# Patient Record
Sex: MALE | Race: WHITE | Employment: FULL TIME | URBAN - NONMETROPOLITAN AREA
[De-identification: names, ages, dates, MRNs, and addresses within clinical notes are randomized per-mention and may not be internally consistent; named-entity substitution may affect disease eponyms.]

---

## 2018-06-17 ENCOUNTER — HOSPITAL ENCOUNTER (EMERGENCY)
Age: 43
Discharge: HOME | End: 2018-06-17
Attending: EMERGENCY MEDICINE
Payer: COMMERCIAL

## 2018-06-17 ENCOUNTER — APPOINTMENT (OUTPATIENT)
Dept: GENERAL RADIOLOGY | Age: 43
End: 2018-06-17
Payer: COMMERCIAL

## 2018-06-17 VITALS
OXYGEN SATURATION: 100 % | DIASTOLIC BLOOD PRESSURE: 112 MMHG | TEMPERATURE: 98.7 F | RESPIRATION RATE: 16 BRPM | HEART RATE: 93 BPM | SYSTOLIC BLOOD PRESSURE: 185 MMHG

## 2018-06-17 DIAGNOSIS — M25.461 KNEE EFFUSION, RIGHT: Primary | ICD-10-CM

## 2018-06-17 LAB
APPEARANCE FLUID: ABNORMAL
BASO FLUID: ABNORMAL %
COLOR FLUID: YELLOW
EOSINOPHIL FLUID: ABNORMAL %
FLUID DIFF COMMENT: ABNORMAL
LYMPHOCYTES, BODY FLUID: 8 %
MONOCYTE, FLUID: ABNORMAL %
NEUTROPHIL, FLUID: 92 %
OTHER CELLS FLUID: ABNORMAL %
RBC FLUID: <3000 /MM3
SPECIMEN TYPE: ABNORMAL
WBC FLUID: ABNORMAL /MM3

## 2018-06-17 PROCEDURE — 6370000000 HC RX 637 (ALT 250 FOR IP): Performed by: EMERGENCY MEDICINE

## 2018-06-17 PROCEDURE — 89051 BODY FLUID CELL COUNT: CPT

## 2018-06-17 PROCEDURE — 86403 PARTICLE AGGLUT ANTBDY SCRN: CPT

## 2018-06-17 PROCEDURE — 87077 CULTURE AEROBIC IDENTIFY: CPT

## 2018-06-17 PROCEDURE — 87205 SMEAR GRAM STAIN: CPT

## 2018-06-17 PROCEDURE — 87186 SC STD MICRODIL/AGAR DIL: CPT

## 2018-06-17 PROCEDURE — 73562 X-RAY EXAM OF KNEE 3: CPT

## 2018-06-17 PROCEDURE — 20605 DRAIN/INJ JOINT/BURSA W/O US: CPT

## 2018-06-17 PROCEDURE — 87075 CULTR BACTERIA EXCEPT BLOOD: CPT

## 2018-06-17 PROCEDURE — 99283 EMERGENCY DEPT VISIT LOW MDM: CPT

## 2018-06-17 PROCEDURE — 89060 EXAM SYNOVIAL FLUID CRYSTALS: CPT

## 2018-06-17 PROCEDURE — 87070 CULTURE OTHR SPECIMN AEROBIC: CPT

## 2018-06-17 RX ORDER — OXYCODONE HYDROCHLORIDE AND ACETAMINOPHEN 5; 325 MG/1; MG/1
1 TABLET ORAL ONCE
Status: COMPLETED | OUTPATIENT
Start: 2018-06-17 | End: 2018-06-17

## 2018-06-17 RX ORDER — OXYCODONE HYDROCHLORIDE AND ACETAMINOPHEN 5; 325 MG/1; MG/1
1 TABLET ORAL EVERY 6 HOURS PRN
Qty: 12 TABLET | Refills: 0 | Status: SHIPPED | OUTPATIENT
Start: 2018-06-17 | End: 2018-06-20

## 2018-06-17 RX ADMIN — OXYCODONE HYDROCHLORIDE AND ACETAMINOPHEN 1 TABLET: 5; 325 TABLET ORAL at 22:06

## 2018-06-17 ASSESSMENT — PAIN DESCRIPTION - PAIN TYPE: TYPE: ACUTE PAIN

## 2018-06-17 ASSESSMENT — PAIN SCALES - GENERAL: PAINLEVEL_OUTOF10: 5

## 2018-06-17 ASSESSMENT — PAIN DESCRIPTION - LOCATION: LOCATION: KNEE

## 2018-06-17 ASSESSMENT — PAIN DESCRIPTION - ORIENTATION: ORIENTATION: RIGHT

## 2018-06-17 ASSESSMENT — PAIN DESCRIPTION - PROGRESSION: CLINICAL_PROGRESSION: GRADUALLY IMPROVING

## 2018-06-19 LAB
CRYSTALS, FLUID: POSITIVE
SPECIMEN TYPE: ABNORMAL

## 2018-06-21 LAB
CULTURE: ABNORMAL
DIRECT EXAM: ABNORMAL
DIRECT EXAM: ABNORMAL
Lab: ABNORMAL
ORGANISM: ABNORMAL
SPECIMEN DESCRIPTION: ABNORMAL
STATUS: ABNORMAL

## 2018-06-29 ENCOUNTER — HOSPITAL ENCOUNTER (OUTPATIENT)
Age: 43
Discharge: HOME | End: 2018-06-29
Payer: COMMERCIAL

## 2018-06-29 LAB
ABSOLUTE EOS #: 0.24 K/UL (ref 0–0.44)
ABSOLUTE IMMATURE GRANULOCYTE: <0.03 K/UL (ref 0–0.3)
ABSOLUTE LYMPH #: 2.42 K/UL (ref 1.1–3.7)
ABSOLUTE MONO #: 0.88 K/UL (ref 0.1–1.2)
ANION GAP SERPL CALCULATED.3IONS-SCNC: 13 MMOL/L (ref 9–17)
BASOPHILS # BLD: 1 % (ref 0–2)
BASOPHILS ABSOLUTE: 0.1 K/UL (ref 0–0.2)
BUN BLDV-MCNC: 16 MG/DL (ref 6–20)
BUN/CREAT BLD: 19 (ref 9–20)
CALCIUM SERPL-MCNC: 10.2 MG/DL (ref 8.6–10.4)
CHLORIDE BLD-SCNC: 97 MMOL/L (ref 98–107)
CO2: 28 MMOL/L (ref 20–31)
CREAT SERPL-MCNC: 0.86 MG/DL (ref 0.7–1.2)
DIFFERENTIAL TYPE: NORMAL
EKG ATRIAL RATE: 84 BPM
EKG P AXIS: 38 DEGREES
EKG P-R INTERVAL: 152 MS
EKG Q-T INTERVAL: 404 MS
EKG QRS DURATION: 90 MS
EKG QTC CALCULATION (BAZETT): 477 MS
EKG R AXIS: 28 DEGREES
EKG T AXIS: 64 DEGREES
EKG VENTRICULAR RATE: 84 BPM
EOSINOPHILS RELATIVE PERCENT: 3 % (ref 1–4)
GFR AFRICAN AMERICAN: >60 ML/MIN
GFR NON-AFRICAN AMERICAN: >60 ML/MIN
GFR SERPL CREATININE-BSD FRML MDRD: ABNORMAL ML/MIN/{1.73_M2}
GFR SERPL CREATININE-BSD FRML MDRD: ABNORMAL ML/MIN/{1.73_M2}
GLUCOSE BLD-MCNC: 98 MG/DL (ref 70–99)
HCT VFR BLD CALC: 45.2 % (ref 40.7–50.3)
HEMOGLOBIN: 15.6 G/DL (ref 13–17)
IMMATURE GRANULOCYTES: 0 %
LYMPHOCYTES # BLD: 25 % (ref 24–43)
MCH RBC QN AUTO: 31.3 PG (ref 25.2–33.5)
MCHC RBC AUTO-ENTMCNC: 34.5 G/DL (ref 28.4–34.8)
MCV RBC AUTO: 90.6 FL (ref 82.6–102.9)
MONOCYTES # BLD: 9 % (ref 3–12)
NRBC AUTOMATED: 0 PER 100 WBC
PDW BLD-RTO: 12 % (ref 11.8–14.4)
PLATELET # BLD: 271 K/UL (ref 138–453)
PLATELET ESTIMATE: NORMAL
PMV BLD AUTO: 10 FL (ref 8.1–13.5)
POTASSIUM SERPL-SCNC: 4 MMOL/L (ref 3.7–5.3)
RBC # BLD: 4.99 M/UL (ref 4.21–5.77)
RBC # BLD: NORMAL 10*6/UL
SEG NEUTROPHILS: 62 % (ref 36–65)
SEGMENTED NEUTROPHILS ABSOLUTE COUNT: 6.08 K/UL (ref 1.5–8.1)
SODIUM BLD-SCNC: 138 MMOL/L (ref 135–144)
WBC # BLD: 9.7 K/UL (ref 3.5–11.3)
WBC # BLD: NORMAL 10*3/UL

## 2018-06-29 PROCEDURE — 80048 BASIC METABOLIC PNL TOTAL CA: CPT

## 2018-06-29 PROCEDURE — 85025 COMPLETE CBC W/AUTO DIFF WBC: CPT

## 2018-06-29 PROCEDURE — 93005 ELECTROCARDIOGRAM TRACING: CPT

## 2018-06-29 PROCEDURE — 36415 COLL VENOUS BLD VENIPUNCTURE: CPT

## 2018-07-18 ENCOUNTER — HOSPITAL ENCOUNTER (OUTPATIENT)
Dept: PHYSICAL THERAPY | Age: 43
Setting detail: THERAPIES SERIES
Discharge: HOME | End: 2018-07-18
Payer: COMMERCIAL

## 2018-07-18 PROCEDURE — G0283 ELEC STIM OTHER THAN WOUND: HCPCS

## 2018-07-18 PROCEDURE — G8978 MOBILITY CURRENT STATUS: HCPCS

## 2018-07-18 PROCEDURE — G8979 MOBILITY GOAL STATUS: HCPCS

## 2018-07-18 PROCEDURE — 97161 PT EVAL LOW COMPLEX 20 MIN: CPT

## 2018-07-18 PROCEDURE — 97110 THERAPEUTIC EXERCISES: CPT

## 2018-07-18 NOTE — PROGRESS NOTES
cueing for proper gait with use of one axillary crutch. ROM of right knee is 15-52 degrees in supine. Strength right ip: grossly 4-/5; knee: 3+/5 flex within available ROM, 3-/5 ext, 8 degrees of additional lag with SLR. Pt will benefit from PT to address deficits. Prognosis: Good        Decision Making: Low Complexity    Patient Education  PT eval, HEP, POC  Pt verbalized/demonstrated good understanding:     [x] Yes         [] No, pt required further clarification. [x] Primary Impairment :  G Code:    [x] Mobility         [] Carry        [] Body Position       [] Self Care      [] Other:   Functional Impairment Current:  [] 0%    [] 1-19% [] 20-39% [] 40-59% [x] 60-79%   [] 80-99% [] 100%  Functional Impairment Goal:  [] 0%    [x] 1-19% [] 20-39% [] 40-59% [] 60-79%   [] 80-99% [] 100%  G Code Functional Impairment determined by:  [x] Clinical Judgment   [] Outcome Measure:     Goals  Short term goals  Time Frame for Short term goals: 3 weeks  Short term goal 1: Pt to be instructed in home program.   Short term goal 2: Pt to achieve 90 degrees of right knee flexion to assist with ADL's. Short term goal 3: Pt to be lacking no greater than 10 degrees of right knee ext for improved gait. Long term goals  Time Frame for Long term goals : 6 weeks  Long term goal 1: Pt to report independence and compliance with home program.   Long term goal 2: Pt to ambulate community distances without use of AD. Long term goal 3: Pt to demonstrate 4+/5 strength of right quad to assist with stairs and squatting. Long term goal 4: Pt to achieve 120 degrees of right knee flexion to assist with squatting at work. Long term goal 5: Pt to be lacking no greater than 2 degrees right knee ext for improved gait.        Patient goals : None stated        Minutes Tracking:  Time In: 1100  Time Out: 9129  Minutes: [address], PT, DPT, CMPT       7/18/2018

## 2018-07-24 ENCOUNTER — HOSPITAL ENCOUNTER (OUTPATIENT)
Dept: PHYSICAL THERAPY | Age: 43
Setting detail: THERAPIES SERIES
Discharge: HOME | End: 2018-07-24
Payer: COMMERCIAL

## 2018-07-24 PROCEDURE — 97140 MANUAL THERAPY 1/> REGIONS: CPT

## 2018-07-24 PROCEDURE — 97110 THERAPEUTIC EXERCISES: CPT

## 2018-07-24 PROCEDURE — G0283 ELEC STIM OTHER THAN WOUND: HCPCS

## 2018-07-25 ENCOUNTER — HOSPITAL ENCOUNTER (OUTPATIENT)
Dept: PHYSICAL THERAPY | Age: 43
Setting detail: THERAPIES SERIES
Discharge: HOME | End: 2018-07-25
Payer: COMMERCIAL

## 2018-07-25 PROCEDURE — 97110 THERAPEUTIC EXERCISES: CPT

## 2018-07-25 PROCEDURE — G0283 ELEC STIM OTHER THAN WOUND: HCPCS

## 2018-07-25 PROCEDURE — 97140 MANUAL THERAPY 1/> REGIONS: CPT

## 2018-07-27 ENCOUNTER — APPOINTMENT (OUTPATIENT)
Dept: PHYSICAL THERAPY | Age: 43
End: 2018-07-27
Payer: COMMERCIAL

## 2018-07-30 ENCOUNTER — HOSPITAL ENCOUNTER (OUTPATIENT)
Dept: PHYSICAL THERAPY | Age: 43
Setting detail: THERAPIES SERIES
Discharge: HOME | End: 2018-07-30
Payer: COMMERCIAL

## 2018-07-30 PROCEDURE — 97140 MANUAL THERAPY 1/> REGIONS: CPT

## 2018-07-30 PROCEDURE — 97110 THERAPEUTIC EXERCISES: CPT

## 2018-07-30 PROCEDURE — G0283 ELEC STIM OTHER THAN WOUND: HCPCS

## 2018-08-01 ENCOUNTER — HOSPITAL ENCOUNTER (OUTPATIENT)
Dept: PHYSICAL THERAPY | Age: 43
Setting detail: THERAPIES SERIES
Discharge: HOME | End: 2018-08-01
Payer: COMMERCIAL

## 2018-08-01 PROCEDURE — 97140 MANUAL THERAPY 1/> REGIONS: CPT

## 2018-08-01 PROCEDURE — G0283 ELEC STIM OTHER THAN WOUND: HCPCS

## 2018-08-01 PROCEDURE — 97110 THERAPEUTIC EXERCISES: CPT

## 2018-08-02 ENCOUNTER — APPOINTMENT (OUTPATIENT)
Dept: PHYSICAL THERAPY | Age: 43
End: 2018-08-02
Payer: COMMERCIAL

## 2018-08-06 ENCOUNTER — HOSPITAL ENCOUNTER (OUTPATIENT)
Dept: PHYSICAL THERAPY | Age: 43
Setting detail: THERAPIES SERIES
Discharge: HOME | End: 2018-08-06
Payer: COMMERCIAL

## 2018-08-06 PROCEDURE — 97140 MANUAL THERAPY 1/> REGIONS: CPT

## 2018-08-06 PROCEDURE — 97110 THERAPEUTIC EXERCISES: CPT

## 2018-08-06 PROCEDURE — G0283 ELEC STIM OTHER THAN WOUND: HCPCS

## 2018-08-06 NOTE — PROGRESS NOTES
[]Met   []Partially met  []Not met   Short term goal 2: Pt to achieve 90 degrees of right knee flexion to assist with ADL's. --met  []Met   []Partially met  []Not met   Short term goal 3: Pt to be lacking no greater than 10 degrees of right knee ext for improved gait. --met  []Met   []Partially met  []Not met      []Met   []Partially met  []Not met     Long Term Goals - Time Frame for Long term goals : 6 weeks  Long term goal 1: Pt to report independence and compliance with home program.  []Met  []Partially met  []Not met   Long term goal 2: Pt to ambulate community distances without use of AD.  []Met  []Partially met  []Not met   Long term goal 3: Pt to demonstrate 4+/5 strength of right quad to assist with stairs and squatting. []Met  []Partially met  []Not met   Long term goal 4: Pt to achieve 120 degrees of right knee flexion to assist with squatting at work. []Met  []Partially met  []Not met   Long term goal 5: Pt to be lacking no greater than 2 degrees right knee ext for improved gait.   []Met  []Partially met  []Not met       Minutes Tracking:  Time In: 9157  Time Out: Ángel  Minutes: 72    Oswaldo Diallo PTA Date: 8/6/2018

## 2018-08-07 ENCOUNTER — HOSPITAL ENCOUNTER (OUTPATIENT)
Dept: PHYSICAL THERAPY | Age: 43
Setting detail: THERAPIES SERIES
Discharge: HOME | End: 2018-08-07
Payer: COMMERCIAL

## 2018-08-07 PROCEDURE — 97110 THERAPEUTIC EXERCISES: CPT

## 2018-08-07 PROCEDURE — 97140 MANUAL THERAPY 1/> REGIONS: CPT

## 2018-08-07 PROCEDURE — G0283 ELEC STIM OTHER THAN WOUND: HCPCS

## 2018-08-07 ASSESSMENT — PAIN SCALES - GENERAL: PAINLEVEL_OUTOF10: 6

## 2018-08-07 NOTE — PROGRESS NOTES
Phone: Cristobal           Fax: [phone]                           Outpatient Physical Therapy                                                                            Daily Note    Patient: Selam Millan : 1975  CSN #: [id number]   Referring Practitioner:  Michelle Adames MD    Referral Date : 18     Date: 2018    Diagnosis: Tear of meiscus right knee, S83.206A; acute gout right knee, M10.9; infection right knee, M00.9  Treatment Diagnosis: Right knee pain    Onset Date: 18  PT Insurance Information: [employer] [place]  Total # of Visits Approved: 12 Per Physician Order         Pre-Treatment Pain:  6/10  Subjective: Pain 5-6/10. Saw  and reports he has gout in knee. Pt put back on meds for gout. Exercises:           Exercise 4: sink exercise 12-15x  Exercise 5: mini lunges  Exercise 6: supine extension stretch 8 min         Exercise 9: prone strap stretch 5 min   Exercise 10: leg press 7pl 2x15  Exercise 11: FSU/SSU 6'--15x                                    Manual:  Joint mobilization: patellar mobs   PROM: right knee                 Modalities:      Cryotherapy (Minutes\Location): 15 min with IFC       E-stim (parameters): IFC for pain and swelling                  Assessment  Assessment: Pt saw  this morning and is being put back on meds for Gout in knee. Pain remains 5-6/10. Stregthening and PROM/mobs to tolerance. IFC/cp for pain and swelling     Patient Education  PROM for home   Pt verbalized/demonstrated good understanding:     [x] Yes         [] No, pt required further clarification. Post Treatment Pain:  6/10      Plan            Goals  ( )   Short Term Goals - Time Frame for Short term goals: 3 weeks     Short term goal 1: Pt to be instructed in home program. --met                                        []Met   []Partially met  []Not met   Short term goal 2: Pt to achieve 90 degrees of right knee flexion to assist with ADL's.

## 2018-08-09 ENCOUNTER — HOSPITAL ENCOUNTER (OUTPATIENT)
Dept: PHYSICAL THERAPY | Age: 43
Setting detail: THERAPIES SERIES
Discharge: HOME | End: 2018-08-09
Payer: COMMERCIAL

## 2018-08-09 PROCEDURE — G0283 ELEC STIM OTHER THAN WOUND: HCPCS

## 2018-08-09 PROCEDURE — 97110 THERAPEUTIC EXERCISES: CPT

## 2018-08-09 PROCEDURE — 97140 MANUAL THERAPY 1/> REGIONS: CPT

## 2018-08-09 ASSESSMENT — PAIN SCALES - GENERAL: PAINLEVEL_OUTOF10: 3

## 2018-08-09 NOTE — PROGRESS NOTES
Short term goal 2: Pt to achieve 90 degrees of right knee flexion to assist with ADL's. --met  []Met   []Partially met  []Not met   Short term goal 3: Pt to be lacking no greater than 10 degrees of right knee ext for improved gait. --met  []Met   []Partially met  []Not met      []Met   []Partially met  []Not met     Long Term Goals - Time Frame for Long term goals : 6 weeks  Long term goal 1: Pt to report independence and compliance with home program.  []Met  []Partially met  []Not met   Long term goal 2: Pt to ambulate community distances without use of AD.  []Met  []Partially met  []Not met   Long term goal 3: Pt to demonstrate 4+/5 strength of right quad to assist with stairs and squatting. []Met  []Partially met  []Not met   Long term goal 4: Pt to achieve 120 degrees of right knee flexion to assist with squatting at work. []Met  []Partially met  []Not met   Long term goal 5: Pt to be lacking no greater than 2 degrees right knee ext for improved gait.   []Met  []Partially met  []Not met       Minutes Tracking:  Time In: 1300  Time Out: 1406  Minutes: [address] [facility] [address] [address] PTA Date: 8/9/2018

## 2018-08-13 ENCOUNTER — HOSPITAL ENCOUNTER (OUTPATIENT)
Dept: PHYSICAL THERAPY | Age: 43
Setting detail: THERAPIES SERIES
Discharge: HOME | End: 2018-08-13
Payer: COMMERCIAL

## 2018-08-13 PROCEDURE — G8979 MOBILITY GOAL STATUS: HCPCS

## 2018-08-13 PROCEDURE — 97110 THERAPEUTIC EXERCISES: CPT

## 2018-08-13 PROCEDURE — G0283 ELEC STIM OTHER THAN WOUND: HCPCS

## 2018-08-13 PROCEDURE — G8978 MOBILITY CURRENT STATUS: HCPCS

## 2018-08-13 NOTE — PROGRESS NOTES
Treatment Pain:  2/10      Plan  Times per week: 3  Plan weeks: 6      Goals  (Total # of Visits to Date: 7)   Short Term Goals - Time Frame for Short term goals: 3 weeks     Short term goal 1: Pt to be instructed in home program. --met                                        []Met   []Partially met  []Not met   Short term goal 2: Pt to achieve 90 degrees of right knee flexion to assist with ADL's. --met  []Met   []Partially met  []Not met   Short term goal 3: Pt to be lacking no greater than 10 degrees of right knee ext for improved gait. --met  []Met   []Partially met  []Not met      []Met   []Partially met  []Not met     Long Term Goals - Time Frame for Long term goals : 6 weeks  Long term goal 1: Pt to report independence and compliance with home program.  []Met  []Partially met  []Not met   Long term goal 2: Pt to ambulate community distances without use of AD.  []Met  []Partially met  []Not met   Long term goal 3: Pt to demonstrate 4+/5 strength of right quad to assist with stairs and squatting. []Met  []Partially met  []Not met   Long term goal 4: Pt to achieve 120 degrees of right knee flexion to assist with squatting at work. []Met  []Partially met  []Not met   Long term goal 5: Pt to be lacking no greater than 2 degrees right knee ext for improved gait.   []Met  []Partially met  []Not met       Minutes Tracking:  Time In: 1130  Time Out: [address]  Minutes: [place] [address] , PT, DPT, CMPT  Date: 8/13/2018

## 2018-08-15 ENCOUNTER — HOSPITAL ENCOUNTER (OUTPATIENT)
Dept: PHYSICAL THERAPY | Age: 43
Setting detail: THERAPIES SERIES
Discharge: HOME | End: 2018-08-15
Payer: COMMERCIAL

## 2018-08-15 PROCEDURE — G0283 ELEC STIM OTHER THAN WOUND: HCPCS

## 2018-08-15 PROCEDURE — 97110 THERAPEUTIC EXERCISES: CPT

## 2018-08-15 PROCEDURE — G8979 MOBILITY GOAL STATUS: HCPCS

## 2018-08-15 PROCEDURE — G8978 MOBILITY CURRENT STATUS: HCPCS

## 2018-08-15 NOTE — PLAN OF CARE
Good    Treatment Plan   Times per week: 3  Plan weeks: 6  [x]HP/CP      [x]Electrical Stim   [x]Therapeutic Exercise      [x]Gait Training  [x]Aquatics   []Ultrasound         [x]Patient Education/HEP   [x]Manual Therapy  []Traction    [x]Neuro-homar        [x]Soft Tissue Mobs            []Home TENS  []Iontophoresis    []Orthotic casting/fitting      []Dry Needling             Electronically signed by: Jada Barron PT, DPT, CMPT    Date: 8/15/2018      ______________________________________ Date: 8/15/2018   Physician Signature

## 2018-08-16 ENCOUNTER — HOSPITAL ENCOUNTER (OUTPATIENT)
Dept: PHYSICAL THERAPY | Age: 43
Setting detail: THERAPIES SERIES
Discharge: HOME | End: 2018-08-16
Payer: COMMERCIAL

## 2018-08-16 PROCEDURE — 97110 THERAPEUTIC EXERCISES: CPT

## 2018-08-16 PROCEDURE — G0283 ELEC STIM OTHER THAN WOUND: HCPCS

## 2018-08-20 ENCOUNTER — APPOINTMENT (OUTPATIENT)
Dept: PHYSICAL THERAPY | Age: 43
End: 2018-08-20
Payer: COMMERCIAL

## 2018-08-20 ENCOUNTER — HOSPITAL ENCOUNTER (OUTPATIENT)
Dept: PHYSICAL THERAPY | Age: 43
Setting detail: THERAPIES SERIES
Discharge: HOME | End: 2018-08-20
Payer: COMMERCIAL

## 2018-08-20 PROCEDURE — G0283 ELEC STIM OTHER THAN WOUND: HCPCS

## 2018-08-20 PROCEDURE — 97110 THERAPEUTIC EXERCISES: CPT

## 2018-08-20 NOTE — PROGRESS NOTES
Phone: Cristobal           Fax: [phone]                           Outpatient Physical Therapy                                                                            Daily Note    Patient: Dav Martinez : 1975  CSN #: [id number]   Referring Practitioner:  Zuleyka Sands MD    Referral Date : 18     Date: 2018    Diagnosis: Tear of meiscus right knee, S83.206A; acute gout right knee, M10.9; infection right knee, M00.9  Treatment Diagnosis: Right knee pain    Onset Date: 18  PT Insurance Information: [employer] [place]  Total # of Visits Approved: 18 Per Physician Order  Total # of Visits to Date: 10  No Show: 0  Canceled Appointment: 0      Pre-Treatment Pain:  4/10  Subjective: States swelling in right knee is getting a lot better. States greatest pain today is felt in left knee. Right knee pain rates 2/10, left 4/10. Will see Dr again tomorrow and plans on informing him of problems on left side. Exercises:  Exercise 2: SciFit 10 min LV 3.0  Exercise 3: step stretches - flex and ext - 3x30 sec  Exercise 6: supine extension stretch 8 min   Exercise 7: star trac 4pl 10x2 --extension; flex - 45# single leg - flex only this date  Exercise 8: [employer] - squats level  Exercise 9: prone strap stretch 5 min           Modalities:  Cryotherapy (Minutes\Location): 15 min with IFC   E-stim (parameters): IFC for pain and swelling              Assessment  Assessment: Pt has completed 12 PT visits. Last update just sent 8/15/18 to physician. ROM of right knee this date was progressed to lacking 4-5 degrees ext from neutral, 110 degrees of knee flexion. Will continue to progress as able. Pt has required slower progression of exercises due to pain levels. Patient Education  Update sent to doctor  Pt verbalized/demonstrated good understanding:     [x] Yes         [] No, pt required further clarification.     Post Treatment Pain:  2/10      Plan  Times per

## 2018-08-21 ENCOUNTER — APPOINTMENT (OUTPATIENT)
Dept: PHYSICAL THERAPY | Age: 43
End: 2018-08-21
Payer: COMMERCIAL

## 2018-08-22 ENCOUNTER — APPOINTMENT (OUTPATIENT)
Dept: PHYSICAL THERAPY | Age: 43
End: 2018-08-22
Payer: COMMERCIAL

## 2018-08-27 ENCOUNTER — HOSPITAL ENCOUNTER (OUTPATIENT)
Dept: PHYSICAL THERAPY | Age: 43
Setting detail: THERAPIES SERIES
Discharge: HOME | End: 2018-08-27
Payer: COMMERCIAL

## 2018-08-27 PROCEDURE — 97530 THERAPEUTIC ACTIVITIES: CPT

## 2018-08-27 PROCEDURE — 97110 THERAPEUTIC EXERCISES: CPT

## 2018-08-29 ENCOUNTER — HOSPITAL ENCOUNTER (OUTPATIENT)
Dept: PHYSICAL THERAPY | Age: 43
Setting detail: THERAPIES SERIES
Discharge: HOME | End: 2018-08-29
Payer: COMMERCIAL

## 2018-08-29 PROCEDURE — G0283 ELEC STIM OTHER THAN WOUND: HCPCS

## 2018-08-29 PROCEDURE — 97110 THERAPEUTIC EXERCISES: CPT

## 2018-08-30 ENCOUNTER — HOSPITAL ENCOUNTER (OUTPATIENT)
Dept: PHYSICAL THERAPY | Age: 43
Setting detail: THERAPIES SERIES
Discharge: HOME | End: 2018-08-30
Payer: COMMERCIAL

## 2018-08-30 PROCEDURE — G8980 MOBILITY D/C STATUS: HCPCS

## 2018-08-30 PROCEDURE — 97140 MANUAL THERAPY 1/> REGIONS: CPT

## 2018-08-30 PROCEDURE — G0283 ELEC STIM OTHER THAN WOUND: HCPCS

## 2018-08-30 PROCEDURE — 97110 THERAPEUTIC EXERCISES: CPT

## 2018-08-30 PROCEDURE — G8979 MOBILITY GOAL STATUS: HCPCS

## 2018-10-30 NOTE — PLAN OF CARE
Phone: Cristobal          Fax: [phone]                            Outpatient Physical Therapy                                                                    Discharge Summary    Patient: Mamadou Ennis  : 1975  CSN #: [id number]   Referring physician: No admitting provider for patient encounter. Referring Practitioner: Tee Herrera MD      Diagnosis: Tear of meiscus right knee, S83.206A; acute gout right knee, M10.9; infection right knee, M00.9      Date Treatment Initiated: 18  Date of Last Treatment: 18      PT Visit Information  Onset Date: 18  PT Insurance Information: [employer] [place]  Total # of Visits Approved: 18  Total # of Visits to Date: 15  Plan of Care/Certification Expiration Date: 18  No Show: 0  Canceled Appointment: 0  Progress Note Counter: gcode at 10th      Frequency/Duration   3 times per week   6 weeks      Treatment Received  [x]HP/CP      [x]Electrical Stim   [x]Therapeutic Exercise      [x]Gait Training  [x]Aquatics   []Ultrasound         [x]Patient Education/HEP   [x]Manual Therapy  []Traction    [x]Neuro-homar        [x]Soft Tissue Mobs            []Home TENS  []Iontophoresis    []Orthotic casting/fitting      []Dry Needling    Assessment  Assessment: Pt had an antalgic gait due to pain. Pain increased throughout session due to exercises. PROM in supine flexion 118 degrees. Girth measurement superior patella R knee was 2 cm greater than left. IFC with CP post treatment for pt discomfort. Goals  Short term goals  Time Frame for Short term goals: 3 weeks  Short term goal 1: Pt to be instructed in home program. --met  Short term goal 2: Pt to achieve 90 degrees of right knee flexion to assist with ADL's. --met  Short term goal 3: Pt to be lacking no greater than 10 degrees of right knee ext for improved gait.  --met    Long term goals  Time Frame for Long term goals : 6 weeks  Long term goal 1: Pt to report

## 2020-06-03 ENCOUNTER — HOSPITAL ENCOUNTER (OUTPATIENT)
Dept: MRI IMAGING | Age: 45
Discharge: HOME | End: 2020-06-05
Payer: COMMERCIAL

## 2020-06-03 PROCEDURE — 73721 MRI JNT OF LWR EXTRE W/O DYE: CPT

## 2020-10-07 ENCOUNTER — HOSPITAL ENCOUNTER (OUTPATIENT)
Dept: PREADMISSION TESTING | Age: 45
Discharge: HOME | End: 2020-10-11
Payer: COMMERCIAL

## 2020-10-07 VITALS
WEIGHT: 255.9 LBS | SYSTOLIC BLOOD PRESSURE: 129 MMHG | TEMPERATURE: 96.9 F | BODY MASS INDEX: 33.91 KG/M2 | OXYGEN SATURATION: 97 % | HEART RATE: 77 BPM | HEIGHT: 73 IN | DIASTOLIC BLOOD PRESSURE: 83 MMHG | RESPIRATION RATE: 16 BRPM

## 2020-10-07 LAB
ANION GAP SERPL CALCULATED.3IONS-SCNC: 12 MMOL/L (ref 9–17)
BUN BLDV-MCNC: 19 MG/DL (ref 6–20)
BUN/CREAT BLD: 22 (ref 9–20)
CALCIUM SERPL-MCNC: 9.8 MG/DL (ref 8.6–10.4)
CHLORIDE BLD-SCNC: 100 MMOL/L (ref 98–107)
CO2: 25 MMOL/L (ref 20–31)
CREAT SERPL-MCNC: 0.87 MG/DL (ref 0.7–1.2)
EKG ATRIAL RATE: 69 BPM
EKG P AXIS: 34 DEGREES
EKG P-R INTERVAL: 160 MS
EKG Q-T INTERVAL: 422 MS
EKG QRS DURATION: 90 MS
EKG QTC CALCULATION (BAZETT): 452 MS
EKG R AXIS: 26 DEGREES
EKG T AXIS: 56 DEGREES
EKG VENTRICULAR RATE: 69 BPM
GFR AFRICAN AMERICAN: >60 ML/MIN
GFR NON-AFRICAN AMERICAN: >60 ML/MIN
GFR SERPL CREATININE-BSD FRML MDRD: ABNORMAL ML/MIN/{1.73_M2}
GFR SERPL CREATININE-BSD FRML MDRD: ABNORMAL ML/MIN/{1.73_M2}
GLUCOSE BLD-MCNC: 112 MG/DL (ref 70–99)
HCT VFR BLD CALC: 45.2 % (ref 40.7–50.3)
HEMOGLOBIN: 14.8 G/DL (ref 13–17)
MCH RBC QN AUTO: 30.1 PG (ref 25.2–33.5)
MCHC RBC AUTO-ENTMCNC: 32.7 G/DL (ref 28.4–34.8)
MCV RBC AUTO: 92.1 FL (ref 82.6–102.9)
NRBC AUTOMATED: 0 PER 100 WBC
PDW BLD-RTO: 12.2 % (ref 11.8–14.4)
PLATELET # BLD: 228 K/UL (ref 138–453)
PMV BLD AUTO: 10.6 FL (ref 8.1–13.5)
POTASSIUM SERPL-SCNC: 3.9 MMOL/L (ref 3.7–5.3)
RBC # BLD: 4.91 M/UL (ref 4.21–5.77)
SODIUM BLD-SCNC: 137 MMOL/L (ref 135–144)
WBC # BLD: 7.4 K/UL (ref 3.5–11.3)

## 2020-10-07 PROCEDURE — 85027 COMPLETE CBC AUTOMATED: CPT

## 2020-10-07 PROCEDURE — 36415 COLL VENOUS BLD VENIPUNCTURE: CPT

## 2020-10-07 PROCEDURE — 80048 BASIC METABOLIC PNL TOTAL CA: CPT

## 2020-10-07 ASSESSMENT — PAIN DESCRIPTION - PAIN TYPE: TYPE: CHRONIC PAIN

## 2020-10-07 ASSESSMENT — PAIN DESCRIPTION - ORIENTATION: ORIENTATION: LEFT

## 2020-10-07 ASSESSMENT — PAIN DESCRIPTION - LOCATION: LOCATION: KNEE

## 2020-10-07 ASSESSMENT — PAIN SCALES - GENERAL: PAINLEVEL_OUTOF10: 4

## 2020-10-07 ASSESSMENT — PAIN DESCRIPTION - DESCRIPTORS: DESCRIPTORS: ACHING

## 2020-10-07 NOTE — PROGRESS NOTES
[facility]   Preadmission Testing    Name: Oscar Dotson  : 1975  Patient Phone: [phone] (home)     Procedure Knee Scope   Date of Procedure: 2020  Surgeon: No att. providers found    Ht:  6' 1\" (185.4 cm)  Wt: 255 lb 14.4 oz (116.1 kg)  Wt method: Actual    Allergies: Allergies   Allergen Reactions    Vicodin [Hydrocodone-Acetaminophen] Nausea And Vomiting       Peanut allergy: No    Latex Allergy Screening Tool  Have you ever had a reaction to or been told by a physician that you have an allergy to latex or natural rubber?: No    Vitals:    10/07/20 0903   BP: 129/83   Pulse: 77   Resp: 16   Temp: 96.9 °F (36.1 °C)   SpO2: 97%       No LMP for male patient. Do you take blood thinners? [x] Yes    [] No         Instructed to stop blood thinners prior to procedure? [x] Yes    [] No      [] N/A   Do you have sleep apnea? [] Yes    [x] No     Instructed to bring CPAP machine? [] Yes    [] No    [x] N/A   Do you have acid reflux ? [x] Yes    [] No     Do you have  hiatal hernia? [x] Yes    [] No    Do you ever experience motion sickness? [] Yes    [x] No     Have you had a respiratory infection or sore throat in last 4 weeks before surgery? [] Yes    [x] No     Do you have poorly controlled asthma or COPD? [] Yes    [x] No     Do you have a history of angina in the last month or symptomatic arrhythmia? [] Yes    [x] No     Do you have significant central nervous system disease? [] Yes    [x] No     Have you had an EKG, labs, or chest xray in last 12 months? If yes provide copies to anesthesia   [] Yes    [x] No       [] Lab    [] EKG    [] CXR     Have you had a stress test?     [x] Yes    [] No    When/where:[place] > 10 years     Was it normal?    [x] Yes    [] No   Do you or your family have a history of Malignant Hyperthermia?    [] Yes    [x] No               PAT Call/Visit Questions  Person Interviewed: patient  Relationship to Patient: self  Surgery Location Verified: Yes  Patient Language: English  Medical History Reviewed: Yes  NPO Status Reinforced: Yes  Ride and Caregiver Arranged: Yes  Ride Caregiver Provider: Wife- Shadow  Patient Knows to Bring Current Medications: Yes    Pre-AdmissionTesting Checklist  Patient has been to this health system before?: Yes  Does patient refuse blood?: No  Healthcare Directive: No, patient does not have an advance directive for healthcare treatment   needed: No  Patient can read and write?: Yes  Meds-to-Beds: Does the patient want to have any new prescriptions delivered to bedside prior to discharge?: Not Assessed  History given by: Patient  Providing self care at home?: Yes  Discharge transport (for same day patients): Family    Patient instructed on the pre-operative, intra-operative, and post-operative process? Yes  Medication instructions reviewed with patient? Yes  Pre operative instruction sheet reviewed and given to patient in PAT?   Yes

## 2020-10-07 NOTE — PROGRESS NOTES
Reza Liang notified of pts EKG and that pt stated at PAT visit that he has had recent chest pain that he thinks is due to anxiety. Pt needs cardiac clearance prior to surgery per Jaime Bryan. Dionisio Mendieta notified at Dr. Rella Klinefelter office that pt needs cardiac clearance.

## 2020-10-26 ENCOUNTER — OFFICE VISIT (OUTPATIENT)
Dept: CARDIOLOGY | Age: 45
End: 2020-10-26
Payer: COMMERCIAL

## 2020-10-26 ENCOUNTER — HOSPITAL ENCOUNTER (OUTPATIENT)
Dept: PREADMISSION TESTING | Age: 45
Setting detail: SPECIMEN
Discharge: HOME | End: 2020-10-30
Payer: COMMERCIAL

## 2020-10-26 VITALS
RESPIRATION RATE: 18 BRPM | WEIGHT: 259.4 LBS | HEART RATE: 77 BPM | OXYGEN SATURATION: 98 % | BODY MASS INDEX: 34.38 KG/M2 | DIASTOLIC BLOOD PRESSURE: 81 MMHG | SYSTOLIC BLOOD PRESSURE: 122 MMHG | HEIGHT: 73 IN

## 2020-10-26 LAB
SARS-COV-2, RAPID: NORMAL
SARS-COV-2: NORMAL
SARS-COV-2: NOT DETECTED
SOURCE: NORMAL

## 2020-10-26 PROCEDURE — U0003 INFECTIOUS AGENT DETECTION BY NUCLEIC ACID (DNA OR RNA); SEVERE ACUTE RESPIRATORY SYNDROME CORONAVIRUS 2 (SARS-COV-2) (CORONAVIRUS DISEASE [COVID-19]), AMPLIFIED PROBE TECHNIQUE, MAKING USE OF HIGH THROUGHPUT TECHNOLOGIES AS DESCRIBED BY CMS-2020-01-R: HCPCS

## 2020-10-26 PROCEDURE — C9803 HOPD COVID-19 SPEC COLLECT: HCPCS

## 2020-10-26 PROCEDURE — 99244 OFF/OP CNSLTJ NEW/EST MOD 40: CPT | Performed by: INTERNAL MEDICINE

## 2020-10-26 PROCEDURE — 93000 ELECTROCARDIOGRAM COMPLETE: CPT | Performed by: INTERNAL MEDICINE

## 2020-10-26 RX ORDER — PANTOPRAZOLE SODIUM 40 MG/1
TABLET, DELAYED RELEASE ORAL
COMMUNITY
Start: 2020-10-20

## 2020-10-26 RX ORDER — ALLOPURINOL 100 MG/1
300 TABLET ORAL
COMMUNITY

## 2020-10-26 SDOH — SOCIAL STABILITY: SOCIAL NETWORK: ARE YOU MARRIED, WIDOWED, DIVORCED, SEPARATED, NEVER MARRIED, OR LIVING WITH A PARTNER?: PATIENT DECLINED

## 2020-10-26 SDOH — HEALTH STABILITY: PHYSICAL HEALTH: ON AVERAGE, HOW MANY MINUTES DO YOU ENGAGE IN EXERCISE AT THIS LEVEL?: PATIENT DECLINED

## 2020-10-26 SDOH — SOCIAL STABILITY: SOCIAL INSECURITY: WITHIN THE LAST YEAR, HAVE YOU BEEN AFRAID OF YOUR PARTNER OR EX-PARTNER?: PATIENT DECLINED

## 2020-10-26 SDOH — SOCIAL STABILITY: SOCIAL NETWORK
DO YOU BELONG TO ANY CLUBS OR ORGANIZATIONS SUCH AS CHURCH GROUPS UNIONS, FRATERNAL OR ATHLETIC GROUPS, OR SCHOOL GROUPS?: PATIENT DECLINED

## 2020-10-26 SDOH — SOCIAL STABILITY: SOCIAL INSECURITY
WITHIN THE LAST YEAR, HAVE TO BEEN RAPED OR FORCED TO HAVE ANY KIND OF SEXUAL ACTIVITY BY YOUR PARTNER OR EX-PARTNER?: PATIENT DECLINED

## 2020-10-26 SDOH — HEALTH STABILITY: PHYSICAL HEALTH
ON AVERAGE, HOW MANY DAYS PER WEEK DO YOU ENGAGE IN MODERATE TO STRENUOUS EXERCISE (LIKE A BRISK WALK)?: PATIENT DECLINED

## 2020-10-26 SDOH — SOCIAL STABILITY: SOCIAL NETWORK: IN A TYPICAL WEEK, HOW MANY TIMES DO YOU TALK ON THE PHONE WITH FAMILY, FRIENDS, OR NEIGHBORS?: PATIENT DECLINED

## 2020-10-26 SDOH — SOCIAL STABILITY: SOCIAL INSECURITY
WITHIN THE LAST YEAR, HAVE YOU BEEN KICKED, HIT, SLAPPED, OR OTHERWISE PHYSICALLY HURT BY YOUR PARTNER OR EX-PARTNER?: PATIENT DECLINED

## 2020-10-26 SDOH — HEALTH STABILITY: MENTAL HEALTH
STRESS IS WHEN SOMEONE FEELS TENSE, NERVOUS, ANXIOUS, OR CAN'T SLEEP AT NIGHT BECAUSE THEIR MIND IS TROUBLED. HOW STRESSED ARE YOU?: PATIENT DECLINED

## 2020-10-26 SDOH — SOCIAL STABILITY: SOCIAL INSECURITY
WITHIN THE LAST YEAR, HAVE YOU BEEN HUMILIATED OR EMOTIONALLY ABUSED IN OTHER WAYS BY YOUR PARTNER OR EX-PARTNER?: PATIENT DECLINED

## 2020-10-26 SDOH — SOCIAL STABILITY: SOCIAL NETWORK: HOW OFTEN DO YOU ATTENT MEETINGS OF THE CLUB OR ORGANIZATION YOU BELONG TO?: PATIENT DECLINED

## 2020-10-26 SDOH — SOCIAL STABILITY: SOCIAL NETWORK: HOW OFTEN DO YOU GET TOGETHER WITH FRIENDS OR RELATIVES?: PATIENT DECLINED

## 2020-10-26 SDOH — SOCIAL STABILITY: SOCIAL NETWORK: HOW OFTEN DO YOU ATTEND CHURCH OR RELIGIOUS SERVICES?: PATIENT DECLINED

## 2020-10-26 NOTE — PROGRESS NOTES
Samira Elias am scribing for and in the presence of Jose Cruz Centeno MD.    Patient: Dev Onofre  : 1975  Date of Visit: 2020    REASON FOR VISIT / CONSULTATION: Establish Cardiologist (HX:HTN Pt is here to est care today he states he is have Left knee scope on  with Dr Gasper Velazquez He states he does have CP sometimes due to acid reflux,SOB with exertion, heart races with exertion. He has put on 40lbs since April. Denies:lightheaded/dizziness,)      History of Present Illness:        Dear Tamika Jones MD and Dr Gasper Velazquez,     I had the pleasure of seeing your patient Dev Onofre as part of a pre-operative cardiac evaluation today. He reported history of coronary artery disease diagnosed by cardiac catheterization about 10 years ago as [facility]. He was told that he has some blockages and was treated with medication. He stopped taking his cardiac medications about a year after his cardiac cath. He did not follow-up with cardiology since. He has been smoking for 20 years and one pack daily. His father had heart disease starting at 61 and  at 59years old. He has controlled hypertension denies having diabetes or high cholesterol. He had heart cath done 10-12 years ago at [facility]. Exercise Tolerance: Mr. Nikita Moura reports that he has a very limited exercise tolerance. His says that he can really not ambulate at all due to left knee pain. Mr. Nikita Moura is here today to establish care for preop. He is having left knee procedure on 2020 with Dr Gasper Velazquez. He was very active until April when he hurt knee. He is complaining of chest discomfort intermittently. He attributes this to his heartburn. He is taking Zantac with minimal relief. No chest pressure or tightness. No precipitating or relieving factors for his epigastric/chest pain. He has shortness of breath on exertion but he attributes this to his weight gain.   He does not sleep well at night but reported no clear history of orthopnea or PND. No fever or cough. No chills. No nausea, vomiting or abdominal pain. No problems with current medications. EKG done today in office 10/26/2020 shows nonspecific T wave abnormalities. 74 bpm.     PAST MEDICAL HISTORY:        Past Medical History:   Diagnosis Date    Angina effort     Gout     Hypertension     no longer on medication       CURRENT ALLERGIES: Vicodin [hydrocodone-acetaminophen] REVIEW OF SYSTEMS: 14 systems were reviewed. Pertinent positives and negatives as above, all else negative. Past Surgical History:   Procedure Laterality Date    CHOLECYSTECTOMY, LAPAROSCOPIC  07/2012    KNEE SURGERY  1995    KNEE SURGERY Right 2018    PILONIDAL CYST EXCISION  2008    Social History:  Social History     Tobacco Use    Smoking status: Current Every Day Smoker     Packs/day: 1.00     Years: 20.00     Pack years: 20.00     Types: Cigarettes    Smokeless tobacco: Never Used   Substance Use Topics    Alcohol use: Yes     Comment: rare    Drug use: No        CURRENT MEDICATIONS:        Outpatient Medications Marked as Taking for the 10/26/20 encounter (Office Visit) with Stacie Alcaraz MD   Medication Sig Dispense Refill    allopurinol (ZYLOPRIM) 100 MG tablet allopurinol 100 mg tablet   TAKE 1 TABLET BY MOUTH EVERY DAY      pantoprazole (PROTONIX) 40 MG tablet TAKE 1 TABLET BY MOUTH EVERY DAY      ibuprofen (ADVIL;MOTRIN) 400 MG tablet Take 1 tablet by mouth every 6 hours as needed for Pain or Fever. 20 tablet 0       FAMILY HISTORY: family history includes Cancer in his father and mother; Heart Disease (age of onset: 61) in his father; Rheum Arthritis in his mother. Physical Examination:     /81 (Site: Left Upper Arm, Position: Sitting, Cuff Size: Large Adult)   Pulse 77   Resp 18   Ht 6' 1\" (1.854 m)   Wt 259 lb 6.4 oz (117.7 kg)   SpO2 98%   BMI 34.22 kg/m²  Body mass index is 34.22 kg/m².     Constitutional: He is oriented to person, place, and time. He appears well-developed and well-nourished. In no acute distress. HEENT: Normocephalic and atraumatic. No JVD present. Carotid bruit is not present. No mass and no thyromegaly present. No lymphadenopathy present. Cardiovascular: Normal rate, regular rhythm, normal heart sounds. Exam reveals no gallop and no friction rubs. No murmur was heard. .   Pulmonary/Chest: Effort normal and breath sounds normal. No respiratory distress. He has no wheezes, rhonchi or rales. Abdominal: Soft, non-tender. Bowel sounds and aorta are normal. He exhibits no organomegaly, mass or bruit. Extremities: No significant edema. No cyanosis or clubbing. 2+ radial and carotid pulses. Distal extremity pulses: 2+ bilaterally. Neurological: He is alert and oriented to person, place, and time. No evidence of gross cranial nerve deficit. Coordination appeared normal.   Skin: Skin is warm and dry. There is no rash or diaphoresis. Psychiatric: He has a normal mood and affect. His speech is normal and behavior is normal.      MOST RECENT LABS ON RECORD:   Lab Results   Component Value Date    WBC 7.4 10/07/2020    HGB 14.8 10/07/2020    HCT 45.2 10/07/2020     10/07/2020    ALT 18 12/18/2014    AST 17 12/18/2014     10/07/2020    K 3.9 10/07/2020     10/07/2020    CREATININE 0.87 10/07/2020    BUN 19 10/07/2020    CO2 25 10/07/2020       ASSESSMENT:     1. Atypical chest pain    2. Preop cardiovascular exam    3. Essential hypertension    4. ASHD (arteriosclerotic heart disease)    5. Abnormal ECG    6. Tobacco abuse    7. Tobacco abuse counseling    8. Obesity (BMI 30.0-34. 9)         PLAN:        · Pre-Op Clearance:   · Atypical chest pain and history of atherosclerotic heart disease diagnosed by cardiac catheterization about 10 years ago. · Patient complaining of epigastric/lower chest pain which he attributes to his heartburn. No significant relief with Zantac.   · History of coronary artery disease diagnosed by cardiac catheterization more than 10 years ago. Cardiac cath was done as an emergency through the [address] Patient took medicine only for 1 year and did not follow-up with cardiology after that. · His ECG today showed sinus rhythm with nonspecific T wave abnormalities. No acute ischemic changes. · Has exertional shortness of breath that he attributes it to limited mobility and weight gain. · Pre-Operative Risk assessment using 2014 ACC/AHA guidelines   · Emergent procedure No  · Active Cardiac Condition No (decompensated HF, Arrhythmia, MI <3 weeks, severe valve disease)  · Risk Level of Procedure Intermediate Risk (intraperitoneal, intrathoracic, HENT, orthopedic, or carotid endarterectomy, etc.)  · Revised Cardiac Risk Index Risk factors: None  · Measurement of Exercise Tolerance before Surgery >4 No  · According to the 2014 ACC/AHA pre-operative risk assessment guidelines Osman Marinelli is at intermediate risk for major cardiac complications during a intermediate risk procedure and may continue as planned. Specific medication recommendations are listed below. Medications recommended to continue should be taken with a sip of water even when NPO.  Medical management to reduce perioperative risk:   Because of his history of atherosclerotic heart disease and recurrent chest pain in addition to minor ECG changes I think it supposed to get ischemic work-up prior to the upcoming procedure. I told him we will get Lexiscan stress test to rule out significant ischemia.  We also need to get hemoglobin A1c, lipid profile and BNP for further risk stratification.  Patient also has shortness of breath on exertion, I will get an echo to evaluate left ventricular systolic function pulmonary artery pressure.  Counseling: I advised Mr. Ba to call our office or go to the emergency room if he develops worsening or persistent chest pain or shortness of breath as this could be life threatening. · Essential Hypertension: Controlled   History of hypertension that is now controlled without medications.  Additional Testing List: I ordered a echocardiogram to assess left ventricular wall thickness, diastolic function and pulmonary artery pressure.  Atherosclerotic Heart Disease: I will obtain his most recent cardiac catheterization from the [facility]. Patient needs to stay on aspirin and lipid-lowering therapy giving history of atherosclerotic heart disease. He is reluctant to start these medicines now. I will wait for the cath report and lipid profile and I will call him with the results. Tobacco Abuse Counseling: I spent several minutes discussing the dangers of tobacco abuse as well as multiple methods for trying to quit smoking. In the end, Mr. Seema Alston said that he did not want any assistance at this time but would continue to try and quit reduce and eventually quit smoking in the near future. Obesity: Body mass index is 34.22 kg/m². I also briefly discussed both diet and exercise strategies for him to continue to loses weight and he was very receptive to this. I also told Mr. Ba to continue his other medications and follow up with you as previously scheduled. FOLLOW UP:   I told Mr. Ba to call my office if he had any problems, but otherwise told him to No follow-ups on file. However, I would be happy to see him sooner should the need arise. Once again, thank you for allowing me to participate in this patients care. Please do not hesitate to contact me could I be of further assistance. Sincerely,  Travon Williamson MD, F.A.C.C.   [facility] Cardiology Specialist    [address]  [facility], [address] [place] [address]  Phone: [phone], Fax: [phone]     I believe that the risk of significant morbidity and mortality related to the patient's current medical conditions are: Intermediate. >60 minutes were spent with the patient and all of their questions were answered. The documentation recorded by the scribe, accurately and completely reflects the services I personally performed and the decisions made by me. Cezar Sarabia MD, F.A.C.C.  October 26, 2020

## 2020-10-26 NOTE — PATIENT INSTRUCTIONS
SURVEY:    You may be receiving a survey from [employer] regarding your visit today. Please complete the survey to enable us to provide the highest quality of care to you and your family. If you cannot score us a very good on any question, please call the office to discuss how we could have made your experience a very good one. Thank you.     Your MA today was Loly Palomo

## 2020-10-29 ENCOUNTER — ANESTHESIA EVENT (OUTPATIENT)
Dept: OPERATING ROOM | Age: 45
End: 2020-10-29
Payer: COMMERCIAL

## 2020-10-30 ENCOUNTER — HOSPITAL ENCOUNTER (OUTPATIENT)
Age: 45
Discharge: HOME | End: 2020-10-30
Payer: COMMERCIAL

## 2020-10-30 ENCOUNTER — HOSPITAL ENCOUNTER (OUTPATIENT)
Dept: NON INVASIVE DIAGNOSTICS | Age: 45
Discharge: HOME | End: 2020-10-30
Payer: COMMERCIAL

## 2020-10-30 LAB
BNP INTERPRETATION: NORMAL
CHOLESTEROL/HDL RATIO: 8.9
CHOLESTEROL: 301 MG/DL
HDLC SERPL-MCNC: 34 MG/DL
LDL CHOLESTEROL DIRECT: 188 MG/DL
LDL CHOLESTEROL: ABNORMAL MG/DL (ref 0–130)
LV EF: 45 %
LVEF MODALITY: NORMAL
PRO-BNP: <20 PG/ML
TRIGL SERPL-MCNC: 512 MG/DL
VLDLC SERPL CALC-MCNC: ABNORMAL MG/DL (ref 1–30)

## 2020-10-30 PROCEDURE — 80061 LIPID PANEL: CPT

## 2020-10-30 PROCEDURE — 83721 ASSAY OF BLOOD LIPOPROTEIN: CPT

## 2020-10-30 PROCEDURE — 93017 CV STRESS TEST TRACING ONLY: CPT

## 2020-10-30 PROCEDURE — 6360000002 HC RX W HCPCS: Performed by: FAMILY MEDICINE

## 2020-10-30 PROCEDURE — 36415 COLL VENOUS BLD VENIPUNCTURE: CPT

## 2020-10-30 PROCEDURE — 78452 HT MUSCLE IMAGE SPECT MULT: CPT

## 2020-10-30 PROCEDURE — 93306 TTE W/DOPPLER COMPLETE: CPT

## 2020-10-30 PROCEDURE — 83880 ASSAY OF NATRIURETIC PEPTIDE: CPT

## 2020-10-30 PROCEDURE — A9500 TC99M SESTAMIBI: HCPCS | Performed by: INTERNAL MEDICINE

## 2020-10-30 PROCEDURE — 3430000000 HC RX DIAGNOSTIC RADIOPHARMACEUTICAL: Performed by: INTERNAL MEDICINE

## 2020-10-30 RX ORDER — ATORVASTATIN CALCIUM 20 MG/1
20 TABLET, FILM COATED ORAL DAILY
Qty: 30 TABLET | Refills: 3 | Status: ON HOLD | OUTPATIENT
Start: 2020-10-30 | End: 2020-11-02

## 2020-10-30 RX ADMIN — REGADENOSON 0.4 MG: 0.08 INJECTION, SOLUTION INTRAVENOUS at 09:23

## 2020-10-30 RX ADMIN — Medication 30 MILLICURIE: at 09:24

## 2020-10-30 RX ADMIN — Medication 10 MILLICURIE: at 09:23

## 2020-10-30 NOTE — PROCEDURES
[address], [place] [address]                              CARDIAC STRESS TEST    PATIENT NAME: Leah Dejesus                    :        1975  MED REC NO:   [id number]                              ROOM:  ACCOUNT NO:   [de-identified]                           ADMIT DATE: 10/30/2020  PROVIDER:     Emilee Wood    CARDIOVASCULAR DIAGNOSTIC DEPARTMENT    DATE OF STUDY:  10/30/2020    ORDERING PROVIDER:  Emilee Case MD    PRIMARY CARE PROVIDER:  Harris Malik MD    INTERPRETING PHYSICIAN:  Emilee Guerrero. Albertina Case MD    PHARMACOLOGIC MYOCARDIAL PERFUSION STRESS TESTING    Rest/Stress single isotope SPECT imaging with exercise stress and gated  SPECT imaging. INDICATIONS:  Preoperative evaluation. Assessment of a cardiac cause:  Abnormal ECG. CLINICAL HISTORY:  The patient is a 26-year-old man with no known  coronary artery disease. Previous cardiac history includes:  Stress test.    Other previous history includes:  Chest pain, fatigue, indigestion,  heartburn, hypertension, family history of coronary artery disease in  father. Symptoms just prior to testing include:  None. Relevant medications:  None. PROCEDURE:  The heart rate was 74 at baseline and eric to 107 beats per  minute during the regadenoson infusion. The rest blood pressure was  146/103 mm/Hg and decreased to 139/88 mm/Hg. The patient did not  complain of any significant symptoms following infusion. Pharmacologic stress testing was performed with regadenoson at a dose of  0.4 mg. Additionally, low level exercise using hand compressions were  performed along with vasodilator infusion. MYOCARDIAL PERFUSION IMAGING:  Imaging was performed at rest 30-45  minutes following the injection of 10 mCi of sestamibi. Approximately  10 seconds after Lexiscan injection, the patient was injected with 30  mCi of sestamibi.   Gating post-stress tomographic imaging was performed  30-45 minutes after stress. STRESS ECG RESULTS:  The resting electrocardiogram demonstrated normal  sinus rhythm without significant ST-segment abnormalities that may  impair accurate ECG detection of stress induced cardiac ischemia. During vasodilator infusion and during recovery, the patient developed:    No significant ST segment changes suggestive of myocardial ischemia with  no premature atrial contractions (PACs) and no premature ventricular  contractions (PVCs). NUCLEAR IMAGING RESULTS:  The overall quality of the study is fair. No  significant attenuation artifact was seen. There is no evidence of  abnormal lung uptake. Additionally, the right ventricle appears normal.  The left ventricular cavity is noted to be normal in size on the stress  images. There is no evidence of transient ischemic dilatation (TID) of  the left ventricle. Gated SPECT imaging reveals normal myocardial thickening and wall motion  with a calculated left ventricular ejection fraction of 61%. The rest images demonstrate homogeneous tracer distribution throughout  the myocardium. SPECT images demonstrate homogeneous tracer distribution throughout the  myocardium. IMPRESSION:  1. Normal myocardial perfusion imaging without significant evidence of  myocardial ischemia or infarction. 2.  Global left ventricular systolic function was normal, without  regional wall motion abnormalities. 3.  No significant electrocardiographic evidence of myocardial ischemia  during EKG monitoring without significant associated arrhythmias. Overall, these results are most consistent with low risk scan. NEVILLE ALEXANDRE    D: 10/30/2020 12:43:08       T: 10/30/2020 12:44:44     [email or web address]  Job#: [id number]     Doc#: Unknown    CC:  Silas Cueva

## 2020-11-02 ENCOUNTER — ANESTHESIA (OUTPATIENT)
Dept: OPERATING ROOM | Age: 45
End: 2020-11-02
Payer: COMMERCIAL

## 2020-11-02 ENCOUNTER — HOSPITAL ENCOUNTER (OUTPATIENT)
Age: 45
Setting detail: OUTPATIENT SURGERY
Discharge: HOME | End: 2020-11-02
Attending: ORTHOPAEDIC SURGERY | Admitting: ORTHOPAEDIC SURGERY
Payer: COMMERCIAL

## 2020-11-02 VITALS
BODY MASS INDEX: 33.8 KG/M2 | TEMPERATURE: 97.4 F | DIASTOLIC BLOOD PRESSURE: 82 MMHG | HEIGHT: 73 IN | OXYGEN SATURATION: 95 % | HEART RATE: 80 BPM | WEIGHT: 255 LBS | RESPIRATION RATE: 16 BRPM | SYSTOLIC BLOOD PRESSURE: 130 MMHG

## 2020-11-02 VITALS — SYSTOLIC BLOOD PRESSURE: 135 MMHG | DIASTOLIC BLOOD PRESSURE: 77 MMHG | OXYGEN SATURATION: 97 %

## 2020-11-02 DIAGNOSIS — Z98.890 S/P LEFT KNEE ARTHROSCOPY: Primary | ICD-10-CM

## 2020-11-02 PROCEDURE — 3700000000 HC ANESTHESIA ATTENDED CARE: Performed by: ORTHOPAEDIC SURGERY

## 2020-11-02 PROCEDURE — 7100000010 HC PHASE II RECOVERY - FIRST 15 MIN: Performed by: ORTHOPAEDIC SURGERY

## 2020-11-02 PROCEDURE — 3600000014 HC SURGERY LEVEL 4 ADDTL 15MIN: Performed by: ORTHOPAEDIC SURGERY

## 2020-11-02 PROCEDURE — 6370000000 HC RX 637 (ALT 250 FOR IP): Performed by: ORTHOPAEDIC SURGERY

## 2020-11-02 PROCEDURE — 2580000003 HC RX 258: Performed by: ORTHOPAEDIC SURGERY

## 2020-11-02 PROCEDURE — 6360000002 HC RX W HCPCS: Performed by: ORTHOPAEDIC SURGERY

## 2020-11-02 PROCEDURE — 3700000001 HC ADD 15 MINUTES (ANESTHESIA): Performed by: ORTHOPAEDIC SURGERY

## 2020-11-02 PROCEDURE — 2500000003 HC RX 250 WO HCPCS: Performed by: NURSE ANESTHETIST, CERTIFIED REGISTERED

## 2020-11-02 PROCEDURE — 3600000004 HC SURGERY LEVEL 4 BASE: Performed by: ORTHOPAEDIC SURGERY

## 2020-11-02 PROCEDURE — 2709999900 HC NON-CHARGEABLE SUPPLY: Performed by: ORTHOPAEDIC SURGERY

## 2020-11-02 PROCEDURE — 88305 TISSUE EXAM BY PATHOLOGIST: CPT

## 2020-11-02 PROCEDURE — 6360000002 HC RX W HCPCS: Performed by: NURSE ANESTHETIST, CERTIFIED REGISTERED

## 2020-11-02 PROCEDURE — 7100000001 HC PACU RECOVERY - ADDTL 15 MIN: Performed by: ORTHOPAEDIC SURGERY

## 2020-11-02 PROCEDURE — 7100000000 HC PACU RECOVERY - FIRST 15 MIN: Performed by: ORTHOPAEDIC SURGERY

## 2020-11-02 PROCEDURE — 7100000011 HC PHASE II RECOVERY - ADDTL 15 MIN: Performed by: ORTHOPAEDIC SURGERY

## 2020-11-02 PROCEDURE — 88160 CYTOPATH SMEAR OTHER SOURCE: CPT

## 2020-11-02 RX ORDER — ONDANSETRON 2 MG/ML
4 INJECTION INTRAMUSCULAR; INTRAVENOUS
Status: DISCONTINUED | OUTPATIENT
Start: 2020-11-02 | End: 2020-11-02 | Stop reason: HOSPADM

## 2020-11-02 RX ORDER — DIMENHYDRINATE 50 MG/1
50 TABLET ORAL ONCE
Status: COMPLETED | OUTPATIENT
Start: 2020-11-02 | End: 2020-11-02

## 2020-11-02 RX ORDER — PROPOFOL 10 MG/ML
INJECTION, EMULSION INTRAVENOUS PRN
Status: DISCONTINUED | OUTPATIENT
Start: 2020-11-02 | End: 2020-11-02 | Stop reason: SDUPTHER

## 2020-11-02 RX ORDER — ROPIVACAINE HYDROCHLORIDE 5 MG/ML
INJECTION, SOLUTION EPIDURAL; INFILTRATION; PERINEURAL PRN
Status: DISCONTINUED | OUTPATIENT
Start: 2020-11-02 | End: 2020-11-02 | Stop reason: ALTCHOICE

## 2020-11-02 RX ORDER — METOCLOPRAMIDE HYDROCHLORIDE 5 MG/ML
INJECTION INTRAMUSCULAR; INTRAVENOUS PRN
Status: DISCONTINUED | OUTPATIENT
Start: 2020-11-02 | End: 2020-11-02 | Stop reason: SDUPTHER

## 2020-11-02 RX ORDER — OXYCODONE HYDROCHLORIDE AND ACETAMINOPHEN 5; 325 MG/1; MG/1
1-2 TABLET ORAL EVERY 6 HOURS PRN
Qty: 40 TABLET | Refills: 0 | Status: SHIPPED | OUTPATIENT
Start: 2020-11-02 | End: 2020-11-05

## 2020-11-02 RX ORDER — INDOMETHACIN 25 MG/1
25 CAPSULE ORAL 3 TIMES DAILY
Qty: 60 CAPSULE | Refills: 0 | Status: SHIPPED | OUTPATIENT
Start: 2020-11-02 | End: 2021-11-02

## 2020-11-02 RX ORDER — MIDAZOLAM HYDROCHLORIDE 1 MG/ML
INJECTION INTRAMUSCULAR; INTRAVENOUS PRN
Status: DISCONTINUED | OUTPATIENT
Start: 2020-11-02 | End: 2020-11-02 | Stop reason: SDUPTHER

## 2020-11-02 RX ORDER — LIDOCAINE HYDROCHLORIDE 20 MG/ML
INJECTION, SOLUTION EPIDURAL; INFILTRATION; INTRACAUDAL; PERINEURAL PRN
Status: DISCONTINUED | OUTPATIENT
Start: 2020-11-02 | End: 2020-11-02 | Stop reason: SDUPTHER

## 2020-11-02 RX ORDER — SODIUM CHLORIDE, SODIUM LACTATE, POTASSIUM CHLORIDE, CALCIUM CHLORIDE 600; 310; 30; 20 MG/100ML; MG/100ML; MG/100ML; MG/100ML
INJECTION, SOLUTION INTRAVENOUS CONTINUOUS
Status: DISCONTINUED | OUTPATIENT
Start: 2020-11-02 | End: 2020-11-02 | Stop reason: HOSPADM

## 2020-11-02 RX ORDER — KETOROLAC TROMETHAMINE 30 MG/ML
INJECTION, SOLUTION INTRAMUSCULAR; INTRAVENOUS PRN
Status: DISCONTINUED | OUTPATIENT
Start: 2020-11-02 | End: 2020-11-02 | Stop reason: SDUPTHER

## 2020-11-02 RX ORDER — ONDANSETRON 2 MG/ML
INJECTION INTRAMUSCULAR; INTRAVENOUS PRN
Status: DISCONTINUED | OUTPATIENT
Start: 2020-11-02 | End: 2020-11-02 | Stop reason: SDUPTHER

## 2020-11-02 RX ORDER — OXYCODONE HYDROCHLORIDE AND ACETAMINOPHEN 5; 325 MG/1; MG/1
1 TABLET ORAL
Status: COMPLETED | OUTPATIENT
Start: 2020-11-02 | End: 2020-11-02

## 2020-11-02 RX ORDER — DEXAMETHASONE SODIUM PHOSPHATE 4 MG/ML
INJECTION, SOLUTION INTRA-ARTICULAR; INTRALESIONAL; INTRAMUSCULAR; INTRAVENOUS; SOFT TISSUE PRN
Status: DISCONTINUED | OUTPATIENT
Start: 2020-11-02 | End: 2020-11-02 | Stop reason: SDUPTHER

## 2020-11-02 RX ORDER — ACETAMINOPHEN 325 MG/1
650 TABLET ORAL ONCE
Status: COMPLETED | OUTPATIENT
Start: 2020-11-02 | End: 2020-11-02

## 2020-11-02 RX ORDER — FENTANYL CITRATE 50 UG/ML
INJECTION, SOLUTION INTRAMUSCULAR; INTRAVENOUS PRN
Status: DISCONTINUED | OUTPATIENT
Start: 2020-11-02 | End: 2020-11-02 | Stop reason: SDUPTHER

## 2020-11-02 RX ORDER — FENTANYL CITRATE 50 UG/ML
50 INJECTION, SOLUTION INTRAMUSCULAR; INTRAVENOUS EVERY 5 MIN PRN
Status: DISCONTINUED | OUTPATIENT
Start: 2020-11-02 | End: 2020-11-02 | Stop reason: HOSPADM

## 2020-11-02 RX ADMIN — MIDAZOLAM 2 MG: 1 INJECTION INTRAMUSCULAR; INTRAVENOUS at 11:57

## 2020-11-02 RX ADMIN — ACETAMINOPHEN 650 MG: 325 TABLET, FILM COATED ORAL at 10:23

## 2020-11-02 RX ADMIN — FAMOTIDINE 20 MG: 10 INJECTION, SOLUTION INTRAVENOUS at 10:58

## 2020-11-02 RX ADMIN — DEXAMETHASONE SODIUM PHOSPHATE 8 MG: 4 INJECTION, SOLUTION INTRAMUSCULAR; INTRAVENOUS at 12:08

## 2020-11-02 RX ADMIN — METOCLOPRAMIDE 10 MG: 5 INJECTION, SOLUTION INTRAMUSCULAR; INTRAVENOUS at 10:58

## 2020-11-02 RX ADMIN — SODIUM CHLORIDE, POTASSIUM CHLORIDE, SODIUM LACTATE AND CALCIUM CHLORIDE: 600; 310; 30; 20 INJECTION, SOLUTION INTRAVENOUS at 10:23

## 2020-11-02 RX ADMIN — KETOROLAC TROMETHAMINE 30 MG: 30 INJECTION, SOLUTION INTRAMUSCULAR; INTRAVENOUS at 13:16

## 2020-11-02 RX ADMIN — PROPOFOL 200 MG: 10 INJECTION, EMULSION INTRAVENOUS at 12:03

## 2020-11-02 RX ADMIN — OXYCODONE HYDROCHLORIDE AND ACETAMINOPHEN 1 TABLET: 5; 325 TABLET ORAL at 15:09

## 2020-11-02 RX ADMIN — FENTANYL CITRATE 50 MCG: 50 INJECTION, SOLUTION INTRAMUSCULAR; INTRAVENOUS at 12:23

## 2020-11-02 RX ADMIN — FENTANYL CITRATE 50 MCG: 50 INJECTION, SOLUTION INTRAMUSCULAR; INTRAVENOUS at 12:50

## 2020-11-02 RX ADMIN — ONDANSETRON 4 MG: 2 INJECTION INTRAMUSCULAR; INTRAVENOUS at 10:58

## 2020-11-02 RX ADMIN — SODIUM CHLORIDE, POTASSIUM CHLORIDE, SODIUM LACTATE AND CALCIUM CHLORIDE: 600; 310; 30; 20 INJECTION, SOLUTION INTRAVENOUS at 13:14

## 2020-11-02 RX ADMIN — DIMENHYDRINATE 50 MG: 50 TABLET ORAL at 10:23

## 2020-11-02 RX ADMIN — LIDOCAINE HYDROCHLORIDE 5 ML: 20 INJECTION, SOLUTION EPIDURAL; INFILTRATION; INTRACAUDAL; PERINEURAL at 12:03

## 2020-11-02 RX ADMIN — FENTANYL CITRATE 50 MCG: 50 INJECTION, SOLUTION INTRAMUSCULAR; INTRAVENOUS at 13:28

## 2020-11-02 RX ADMIN — DEXTROSE MONOHYDRATE 2 G: 50 INJECTION, SOLUTION INTRAVENOUS at 11:55

## 2020-11-02 RX ADMIN — FENTANYL CITRATE 50 MCG: 50 INJECTION, SOLUTION INTRAMUSCULAR; INTRAVENOUS at 11:59

## 2020-11-02 ASSESSMENT — PAIN - FUNCTIONAL ASSESSMENT
PAIN_FUNCTIONAL_ASSESSMENT: PREVENTS OR INTERFERES SOME ACTIVE ACTIVITIES AND ADLS
PAIN_FUNCTIONAL_ASSESSMENT: 0-10

## 2020-11-02 ASSESSMENT — LIFESTYLE VARIABLES: SMOKING_STATUS: 1

## 2020-11-02 ASSESSMENT — PAIN SCALES - GENERAL: PAINLEVEL_OUTOF10: 0

## 2020-11-02 ASSESSMENT — PAIN DESCRIPTION - PAIN TYPE: TYPE: SURGICAL PAIN

## 2020-11-02 ASSESSMENT — PAIN DESCRIPTION - ORIENTATION: ORIENTATION: LEFT

## 2020-11-02 ASSESSMENT — ENCOUNTER SYMPTOMS: DYSPNEA ACTIVITY LEVEL: NO INTERVAL CHANGE

## 2020-11-02 ASSESSMENT — PAIN DESCRIPTION - LOCATION: LOCATION: KNEE

## 2020-11-02 NOTE — ANESTHESIA PRE PROCEDURE
Department of Anesthesiology  Preprocedure Note       Name:  Jamison Torres   Age:  38 y.o.  :  1975                                          MRN:  [id number]         Date:  2020      Surgeon: Maribeth Eng):  Graham Laguna MD    Procedure: Procedure(s):  KNEE ARTHROSCOPY-MEDIAL MENISECTOMY, CYST REMOVAL    Medications prior to admission:   Prior to Admission medications    Medication Sig Start Date End Date Taking? Authorizing Provider   allopurinol (ZYLOPRIM) 100 MG tablet 300 mg    Yes Historical Provider, MD   ranitidine (ZANTAC) 150 MG capsule Take 150 mg by mouth daily as needed for Heartburn   Yes Historical Provider, MD   pantoprazole (PROTONIX) 40 MG tablet TAKE 1 TABLET BY MOUTH EVERY DAY 10/20/20   Historical Provider, MD   colchicine (COLCRYS) 0.6 MG tablet Take 0.6 mg by mouth daily    Historical Provider, MD   ibuprofen (ADVIL;MOTRIN) 400 MG tablet Take 1 tablet by mouth every 6 hours as needed for Pain or Fever. 14   Prince Jorge APRN - CNP       Current medications:    Current Facility-Administered Medications   Medication Dose Route Frequency Provider Last Rate Last Dose    lactated ringers infusion   Intravenous Continuous Graham Laguna  mL/hr at 20 1023      ceFAZolin (ANCEF) 2 g in dextrose 5 % 100 mL IVPB  2 g Intravenous Once Graham Laguna MD           Allergies:     Allergies   Allergen Reactions    Vicodin [Hydrocodone-Acetaminophen] Nausea And Vomiting       Problem List:    Patient Active Problem List   Diagnosis Code    Symptomatic gallstones K80.20    Postoperative diarrhea R19.7       Past Medical History:        Diagnosis Date    Angina effort     Gout     Hypertension     no longer on medication       Past Surgical History:        Procedure Laterality Date    CHOLECYSTECTOMY, LAPAROSCOPIC  2012    KNEE SURGERY      KNEE SURGERY Right 2018    PILONIDAL CYST EXCISION         Social History:    Social History     Tobacco Use    Smoking status: Current Every Day Smoker     Packs/day: 1.00     Years: 20.00     Pack years: 20.00     Types: Cigarettes    Smokeless tobacco: Never Used   Substance Use Topics    Alcohol use: Yes     Comment: rare                                Ready to quit: Not Answered  Counseling given: Not Answered      Vital Signs (Current):   Vitals:    11/02/20 1002   BP: (!) 143/90   Pulse: 69   Resp: 17   Temp: 36.2 °C (97.2 °F)   TempSrc: Temporal   SpO2: 98%   Weight: 255 lb (115.7 kg)   Height: 6' 1\" (1.854 m)                                              BP Readings from Last 3 Encounters:   11/02/20 (!) 143/90   10/26/20 122/81   10/07/20 129/83       NPO Status: Time of last liquid consumption: 2000                        Time of last solid consumption: 2000                        Date of last liquid consumption: 11/01/20                        Date of last solid food consumption: 11/01/20    BMI:   Wt Readings from Last 3 Encounters:   11/02/20 255 lb (115.7 kg)   10/26/20 259 lb 6.4 oz (117.7 kg)   10/07/20 255 lb 14.4 oz (116.1 kg)     Body mass index is 33.64 kg/m². CBC:   Lab Results   Component Value Date    WBC 7.4 10/07/2020    RBC 4.91 10/07/2020    HGB 14.8 10/07/2020    HCT 45.2 10/07/2020    MCV 92.1 10/07/2020    RDW 12.2 10/07/2020     10/07/2020       CMP:   Lab Results   Component Value Date     10/07/2020    K 3.9 10/07/2020     10/07/2020    CO2 25 10/07/2020    BUN 19 10/07/2020    CREATININE 0.87 10/07/2020    GFRAA >60 10/07/2020    LABGLOM >60 10/07/2020    GLUCOSE 112 10/07/2020    PROT 7.3 12/18/2014    CALCIUM 9.8 10/07/2020    BILITOT 0.33 12/18/2014    ALKPHOS 84 12/18/2014    AST 17 12/18/2014    ALT 18 12/18/2014       POC Tests: No results for input(s): POCGLU, POCNA, POCK, POCCL, POCBUN, POCHEMO, POCHCT in the last 72 hours.     Coags: No results found for: PROTIME, INR, APTT    HCG (If Applicable): No results found for: PREGTESTUR, PREGSERUM, HCG, HCGQUANT ABGs: No results found for: PHART, PO2ART, ZUH1QML, RSH5EMI, BEART, X3HCSMOV     Type & Screen (If Applicable):  No results found for: LABABO, LABRH    Drug/Infectious Status (If Applicable):  No results found for: HIV, HEPCAB    COVID-19 Screening (If Applicable):   Lab Results   Component Value Date    COVID19 Not Detected 10/26/2020         Anesthesia Evaluation  Patient summary reviewed and Nursing notes reviewed no history of anesthetic complications:   Airway: Mallampati: I  TM distance: >3 FB   Neck ROM: full  Mouth opening: > = 3 FB Dental: normal exam         Pulmonary:normal exam  breath sounds clear to auscultation  (+) current smoker          Patient did not smoke on day of surgery. Cardiovascular:  Exercise tolerance: good (>4 METS),   (+) angina: no interval change, past MI: > 6 months and no interval change, CAD:, ALAMO: no interval change,     (-) hypertension    ECG reviewed  Rhythm: regular  Rate: normal  Echocardiogram reviewed    Cleared by cardiology           ROS comment: was on HTN medications at one time, no longer needs  Echo - Oct 2020 Global left ventricular systolic function appears mildly reduced with an  estimated ejection fraction of 45%. EKG normal  Cardiac clearance per Dr. Eleanor Valencia, intermediate risk  denies recent chest pain     Neuro/Psych:   Negative Neuro/Psych ROS              GI/Hepatic/Renal:   (+) GERD:,           Endo/Other:              Pt had PAT visit. ROS comment: gout Abdominal:           Vascular: negative vascular ROS. Anesthesia Plan      general     ASA 3       Induction: intravenous. Anesthetic plan and risks discussed with patient.                       SHELLEY Altman - CRNA   11/2/2020

## 2020-11-02 NOTE — ANESTHESIA POSTPROCEDURE EVALUATION
Department of Anesthesiology  Postprocedure Note    Patient: Bree Esteban  MRN: [id number]  YOB: 1975  Date of evaluation: 11/2/2020  Time:  3:47 PM     Procedure Summary     Date:  11/02/20 Room / Location:  [address] [place] [address]    Anesthesia Start:  7513 Anesthesia Stop:      Procedure:  KNEE ARTHROSCOPY-MEDIAL MENISECTOMY,  DRAINAGE OF CYST LEFT KNEE, CHONDROPLASTY (Left ) Diagnosis:  (MEDIAL MENISCUS TEAR LATERAL MENISCUS CYST)    Surgeon:  Ninoska Nixon MD Responsible Provider:  SHELLEY Ruiz CRNA    Anesthesia Type:  general ASA Status:  3          Anesthesia Type: general    Martin Phase I: Martin Score: 9    Martin Phase II: Martin Score: 10    Last vitals: Reviewed and per EMR flowsheets.        Anesthesia Post Evaluation    Patient location during evaluation: PACU  Patient participation: complete - patient participated  Level of consciousness: awake and alert  Pain score: 3  Airway patency: patent  Nausea & Vomiting: no nausea and no vomiting  Complications: no  Cardiovascular status: blood pressure returned to baseline  Respiratory status: acceptable and room air  Hydration status: stable

## 2020-11-02 NOTE — BRIEF OP NOTE
Brief Postoperative Note      Patient: Pierre Florina  YOB: 1975  MRN: [id number]    Date of Procedure: 11/2/2020    Pre-Op Diagnosis: MEDIAL MENISCUS TEAR LATERAL MENISCUS CYST  Chondromalacia  Left knee     Post-Op Diagnosis: Same       Procedure(s):  KNEE ARTHROSCOPY-MEDIAL MENISECTOMY,  DRAINAGE OF CYST LEFT KNEE, CHONDROPLASTY    Surgeon(s):  Jose Garrett MD    Assistant:  * No surgical staff found *    Anesthesia: General    Estimated Blood Loss (mL): Minimal    Complications: None    Specimens:   ID Type Source Tests Collected by Time Destination   A :  Tissue Joint, Knee SURGICAL PATHOLOGY Jose Garrett MD 11/2/2020 1231        Implants:  * No implants in log *      Drains: * No LDAs found *    Findings:     Electronically signed by Pratima Cruz MD on 11/2/2020 at 1:39 PM

## 2020-11-02 NOTE — PROGRESS NOTES
Discharge instructions given to pt and wife. States #3 pain good to go home with. Pain not increasing. Discharge Criteria    Inpatients must meet Criteria 1 through 7. All other patients are either YES or N/A. If a NO is chosen then Anesthesia or Surgeon must be notified. 1.  Minimum 30 minutes after last dose of sedative medication, minimum 120 minutes after last dose of reversal agent. Yes      2. Systolic BP stable within 20 mmHg for 30 minutes & systolic BP between 90 & 275 or within 10 mmHg of baseline. Yes      3. Pulse between 60 and 100 or within 10 bpm of baseline. Yes      4. Spontaneous respiratory rate >/= 10 per minute. Yes      5. SaO2 >/= 95 or  >/= baseline. Yes      6. Able to cough and swallow or return to baseline function. Yes      7. Alert and oriented or return to baseline mental status. Yes      8. Demonstrates controlled, coordinated movements, ambulates with steady gait, or return to baseline activity function. Yes      9. Minimal or no pain or nausea, or at a level tolerable and acceptable to patient. Yes      10. Takes and retains oral fluids as allowed. Yes      11. Procedural / perioperative site stable. Minimal or no bleeding. Yes          12. If GI endoscopy procedure, minimal or no abdominal distention or passing flatus. N/A      13. Written discharge instructions and emergency telephone number provided. Yes      14. Accompanied by a responsible adult.     Yes

## 2020-11-03 NOTE — OP NOTE
[address], [place] [address]                                OPERATIVE REPORT    PATIENT NAME: Tamia Marin                    :        1975  MED REC NO:   [id number]                              ROOM:  ACCOUNT NO:   [de-identified]                           ADMIT DATE: 2020  PROVIDER:     Dusty Rogers    DATE OF PROCEDURE:  2020    PREOPERATIVE DIAGNOSES:  1. Torn medial meniscus of left knee. 2.  Medial meniscal cyst.  3.  Chondromalacia. POSTOPERATIVE DIAGNOSES:  1. Torn medial meniscus. 2.  Medial meniscal cyst.  3.  Chondromalacia. 4.  Tophaceous gouty deposits. Procedure:  1. Arthroscopy left knee  2. Partial medial menisectomy  3. Chondroplasty  4. Drainage of meniscal cyst    SURGEON:  Dr. Dusty Rogers. ANESTHESIA:  General.    DESCRIPTION OF PROCEDURE:  The patient was brought into the operating  and placed in the supine position on the operating table. Received  preoperative antibiotics. Left lower extremity was prepped with  ChloraPrep and draped in a sterile fashion. Tourniquet was inflated to  350 mmHg. The arthroscope was brought in through an anterolateral  portal.  No joint effusion was expressed. The knee was distended with  lactated Ringer's solution. The Anteromedial portal was then created  for instrumentation. The patient was noted to have lot of white  tophaceous type gouty deposits impregnated into the soft tissues and  into the articular cartilage. There was some chondromalacia underneath  the patella and patellofemoral groove. Medial joint line was examined. The patient had a horizontal cleavage tear involving the medial  meniscus, which started out anteriorly and extended back to the  posterior horn region.   Basket forceps were then used along with a  shaver from the anteromedial portal and a partial medial meniscectomy  was carried out contouring the medial meniscus and a good clean contour  was obtained. The rim of meniscus was preserved. The shaver was then  brought to just above the medial meniscus, where the meniscal cyst was  oriented at the level of the tear and a window was opened in this area  to drain the cyst.  The patient had chondromalacia involving the  weightbearing area of the medial femoral condyle, which went down  through the 30% to 40% thickness of cartilage and a chondroplasty was  performed in this area. Notch area was examined. The ACL was intact. Some tophaceous gouty deposits were noted at its base, which were  debrided. The patient also had a large pocket of tophaceous gout in the  notch area just in front of the PCL, which was also debrided. Lateral  compartment was examined. Some minimal degenerate tearing of the  marginal lateral meniscus was noted, which was easily debrided. The  patient had a little synovitis around the posterior horn region, which  was debrided and again some tophaceous gouty deposits were noted down  the lateral femoral condyle, which were debrided. Synovitis anteriorly,  which was also cleaned up. The patellofemoral compartment was then  examined. The medial gutter was cleaned out and partial synovectomy was  performed anteriorly. The chondromalacia involving the patellofemoral  groove went down to 20% to 25% thickness of cartilage and the several  small flap tears were debrided. No full-thickness areas were noted. The patella likewise had some 20% chondromalacia centrally, which was  easily debrided. The arthroscope and shaver were then switched portals. The lateral gutter was cleaned out followed by completing the  chondroplasty underneath the patella and patellofemoral groove. The  knee joint was then drained. An arthrotomy was made medial and this was  taken down to the meniscal cyst.  There were several lobulated portions  of this and this area was thoroughly debrided.   Care was taken to  protect the medial collateral ligament. Wounds were then irrigated out. The deep fascial structures were injected with Naropin. Subcutaneous  was closed with 2-0 Vicryl and 4-0 undyed Monocryl subcuticular stitch  medially. Portal sites were closed with 4-0 nylon interrupted simple  sutures. The wounds were dressed with an Adaptic, fluffs, ABD, Kerlix  roll, and ACE wrap from toe to groin. Estimated blood loss was minimal.  The patient was transferred to Recovery in stable condition and will be  discharged home. Percocet for discomfort. To be followed up in the  office in two weeks. JL GRIJALVA    D: 11/02/2020 16:37:00       T: 11/02/2020 16:42:06     [id number]  Job#: [id number]     Doc#: [id number]    CC:

## 2020-11-04 LAB — SURGICAL PATHOLOGY REPORT: NORMAL

## 2020-11-18 ENCOUNTER — HOSPITAL ENCOUNTER (OUTPATIENT)
Dept: PHYSICAL THERAPY | Age: 45
Setting detail: THERAPIES SERIES
Discharge: HOME | End: 2020-11-18
Payer: COMMERCIAL

## 2020-11-18 PROCEDURE — 97110 THERAPEUTIC EXERCISES: CPT

## 2020-11-18 PROCEDURE — 97162 PT EVAL MOD COMPLEX 30 MIN: CPT

## 2020-11-18 PROCEDURE — G0283 ELEC STIM OTHER THAN WOUND: HCPCS

## 2020-11-18 NOTE — PLAN OF CARE
[facility]           Phone: [phone]             Outpatient Physical Therapy  Fax: [phone]                                           Date: 2020  Patient: Ilene Lopez : 1975 CSN #: [id number]   Referring Practitioner:  Sandip Smith MD Referral Date:  20       [x] Plan of Care   [] Updated Plan of Care    Dates of Service to Include: 2020 to 20    Diagnosis:  Other tear of medial meniscus left knee, S83.242A    Rehab (Treatment) Diagnosis:  left knee pain, difficulty walking             Onset Date:  20    Attendance  Total # of Visits to Date: 1 No Show: 0 Canceled Appointment: 0    Assessment  Assessment: Pt is 39 y/o male with recent left knee surgery. Ambulates without use of AD. ROM of left knee: 10-75 degrees in supine. Strength 4/5 with no ext lag with SLR. Circumfrance suprapatella: left - 46.8, right - 44.5; infra: left - 40.0, right - 38.0. Moderate tenderness with palpation throughout left knee. Pt will benefit from PT to address deficits. Goals  Short term goals  Time Frame for Short term goals: 3 weeks  Short term goal 1: Pt to be instructed in home program.  Short term goal 2: Pt to be lacking no greater than 5 degrees of left knee ext to assist with proper gait. Short term goal 3: Pt to achieve 90 degrees of left knee flexion to assist with ADL's. Long term goals  Time Frame for Long term goals : 6 weeks  Long term goal 1: Pt to report independence and compliance with home program.  Long term goal 2: Pt to achieve 120 degrees of left knee flexion to assist with work related tasks. Long term goal 3: Pt to be lacking no greater than 1 degree of left knee ext to assist with proper gait. Long term goal 4: Pt to demonstrate 4+/5 strength of left quad to assist with functional tasks.   Long term goal 5: Pt to ascend/descend 6 inch steps without use of handrail.      Prognosis  Prognosis: Good    Treatment Plan   Times per week: 3  Plan weeks: 6  [x] HP/CP      [x] Electrical Stim   [x] Therapeutic Exercise      [x] Gait Training  [] Aquatics   [] Ultrasound         [x] Patient Education/HEP   [x] Manual Therapy  [] Traction    [x] Neuro-homar        [x] Soft Tissue Mobs            [] Home TENS  [] Iontophoresis    [] Orthotic casting/fitting      [] Dry Needling             Electronically signed by: Samir Aparicio PT, DPT, CMPT    Date: 11/18/2020      ______________________________________ Date: 11/18/2020   Physician Signature

## 2020-11-18 NOTE — PROGRESS NOTES
tenderness with palpation throughout left knee. Pt will benefit from PT to address deficits. Prognosis: Good        Decision Making: Medium Complexity    Patient Education  Patient Education: PT eval, POC, and HEP  Pt verbalized/demonstrated good understanding:     [X] Yes         [] No, pt required further clarification. Goals  Short term goals  Time Frame for Short term goals: 3 weeks  Short term goal 1: Pt to be instructed in home program.  Short term goal 2: Pt to be lacking no greater than 5 degrees of left knee ext to assist with proper gait. Short term goal 3: Pt to achieve 90 degrees of left knee flexion to assist with ADL's. Long term goals  Time Frame for Long term goals : 6 weeks  Long term goal 1: Pt to report independence and compliance with home program.  Long term goal 2: Pt to achieve 120 degrees of left knee flexion to assist with work related tasks. Long term goal 3: Pt to be lacking no greater than 1 degree of left knee ext to assist with proper gait. Long term goal 4: Pt to demonstrate 4+/5 strength of left quad to assist with functional tasks. Long term goal 5: Pt to ascend/descend 6 inch steps without use of handrail.       Patient goals : \"to back to normal life\"        Minutes Tracking:  Time In: 5728  Time Out: 2964  Minutes: 61  Timed Code Treatment Minutes: 59 Minutes      Treatment Charges: Code Total Mins Units Time In/Out   [x] Evaluation       []  Low       [x]  Moderate       []  High  [] Re-Evaluation   57852  22749  43798  01419 71 6 0294-0560   [x]  Ther Exercise 63390 20 1 8920-9935   []  Manual Therapy 16999        [x]  Unattend Estim 05128 15 1 6020-2055   []  Ultrasound 28568      []  Iontophoresis 99903      []  Neuro Kevin 19697      []  Aquatics 24343      []  Traction 38103      []  Work Hardening 38278      []  FCE 63345      Total Treatment time  61 min           Kasandra Devine, PT, DPT, CMPT    11/18/2020

## 2020-11-19 ENCOUNTER — HOSPITAL ENCOUNTER (OUTPATIENT)
Dept: PHYSICAL THERAPY | Age: 45
Setting detail: THERAPIES SERIES
Discharge: HOME | End: 2020-11-19
Payer: COMMERCIAL

## 2020-11-19 PROCEDURE — G0283 ELEC STIM OTHER THAN WOUND: HCPCS

## 2020-11-19 PROCEDURE — 97110 THERAPEUTIC EXERCISES: CPT

## 2020-11-19 PROCEDURE — 97140 MANUAL THERAPY 1/> REGIONS: CPT

## 2020-11-19 NOTE — PROGRESS NOTES
Phone: [facility]           Fax: [phone]                           Outpatient Physical Therapy                                                                            Daily Note    Patient: Beka Cottrell : 1975  CSN #: [id number]   Referring Practitioner:  Ramakrishna Severino MD    Referral Date : 20     Date: 2020    Diagnosis: Other tear of medial meniscus left knee, S83.242A  Treatment Diagnosis: left knee pain, difficulty walking    Onset Date: 20  PT Insurance Information: [facility] - 12 visits  Total # of Visits Approved: 12 Per Physician Order  Total # of Visits to Date: 2  No Show: 0  Canceled Appointment: 0      Pre-Treatment Pain:  4/10  Subjective: Pt with 4/10 pain. states knee feels very stiff and tight    Exercises:  Exercise 1: **HEP - heel prop, quad sets, sitting knee flex  Exercise 2: heel prop - 30s x 3  Exercise 3: quad sets on towel - 5sec 10x  Exercise 4: sitting knee flex  Exercise 5: Bike 10 min  Exercise 6: Sink exercise  Exercise 7: step lunge 10x hold 15 sec    Manual:  PROM: Right knee seated and supine    Modalities:  Cryotherapy (Minutes\Location): 15mins with IFC  E-stim (parameters): IFC for pain and swelling       Assessment  Assessment: Pt progressed to gym exercise with fair tolerance. Mederate swelling present. Knee extension -6 deg, flexion 95 deg. Gait with no device with stiff leg pattern. HEP progressed and reviewed    Activity Tolerance  Activity Tolerance: Patient Tolerated treatment well    Patient Education  Patient Education: Ptrogression of program  Pt verbalized/demonstrated good understanding:     [x] Yes         [] No, pt required further clarification.        Post Treatment Pain:  4/10      Plan  Times per week: 3  Plan weeks: 6      Goals  (Total # of Visits to Date: 2)      Short term goals  Time Frame for Short term goals: 3 weeks  Short term goal 1: Pt to be instructed in home program.--met  Short term goal 2: Pt to be lacking no greater than 5 degrees of left knee ext to assist with proper gait. --progressing  Short term goal 3: Pt to achieve 90 degrees of left knee flexion to assist with ADL's.--met    Long term goals  Time Frame for Long term goals : 6 weeks  Long term goal 1: Pt to report independence and compliance with home program.  Long term goal 2: Pt to achieve 120 degrees of left knee flexion to assist with work related tasks. Long term goal 3: Pt to be lacking no greater than 1 degree of left knee ext to assist with proper gait. Long term goal 4: Pt to demonstrate 4+/5 strength of left quad to assist with functional tasks. Long term goal 5: Pt to ascend/descend 6 inch steps without use of handrail.     Minutes Tracking:  Time In: 0745  Time Out: 2929 [place]  Minutes: 65  Timed Code Treatment Minutes: 60 Minutes  Treatment Charges: Code Total Mins Units Time In/Out   [] Evaluation       []  Low       []  Moderate       []  High  [] Re-Evaluation   16627  61838  08364  52523      [x]  Ther Exercise 37063 12 4 712-188   [x]  Manual Therapy 89346 10  1  393-672   [x]  Unattend Estim 98636 41 2 226-017   []  Ultrasound 63310      []  Iontophoresis 08275      []  Neuro Kevin 06722      []  Aquatics 94886      []  Traction 28905      []  Work Hardening 38057      []  FCE 71327      Total Treatment time  60 min 4         Oswaldo Diallo PTA     Date: 11/19/2020

## 2020-11-23 ENCOUNTER — HOSPITAL ENCOUNTER (OUTPATIENT)
Dept: PHYSICAL THERAPY | Age: 45
Setting detail: THERAPIES SERIES
Discharge: HOME | End: 2020-11-23
Payer: COMMERCIAL

## 2020-11-23 PROCEDURE — G0283 ELEC STIM OTHER THAN WOUND: HCPCS

## 2020-11-23 PROCEDURE — 97110 THERAPEUTIC EXERCISES: CPT
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
## 2020-11-25 ENCOUNTER — HOSPITAL ENCOUNTER (OUTPATIENT)
Dept: PHYSICAL THERAPY | Age: 45
Setting detail: THERAPIES SERIES
Discharge: HOME | End: 2020-11-25
Payer: COMMERCIAL

## 2020-11-25 PROCEDURE — 97110 THERAPEUTIC EXERCISES: CPT

## 2020-11-25 PROCEDURE — G0283 ELEC STIM OTHER THAN WOUND: HCPCS

## 2020-11-27 NOTE — PROGRESS NOTES
Phone: [facility]           Fax: [phone]                           Outpatient Physical Therapy                                                                            Daily Note    Patient: Reed Thorpe : 1975  CSN #: [id number]   Referring Practitioner:  Lukasz Mahoney MD    Referral Date : 20     Date: 2020    Diagnosis: Other tear of medial meniscus left knee, S83.242A  Treatment Diagnosis: left knee pain, difficulty walking    Onset Date: 20  PT Insurance Information: [facility] - 12 visits  Total # of Visits Approved: 12 Per Physician Order  Total # of Visits to Date: 4  No Show: 0  Canceled Appointment: 0      Pre-Treatment Pain:  3/10  Subjective: Pt states pain this morning rates about 3/10. Not hurting as bad as he was last session. Exercises:  Exercise 2: heel prop - 30s x 3  Exercise 3: quad sets on towel - 5sec 10x  Exercise 5: Bike 10 min  Exercise 7: step lunge 10x hold 10 sec  Exercise 9: Heel slide 10x10\"  Exercise 10: step stretch flex and ext - 20s 5x each    Manual:  Joint mobilization: Patellar mobs  PROM: Right knee seated and supine    Modalities:  Cryotherapy (Minutes\Location): 15mins with IFC  E-stim (parameters): IFC for pain and swelling       Assessment  Assessment: Pt achieves full ext this date. Continues to ambulate with limited left knee flexion during swing through. Will continue to progress as pt tolerates. Activity Tolerance  Activity Tolerance: Patient Tolerated treatment well    Patient Education  Patient Education: Progression of ex  Pt verbalized/demonstrated good understanding:     [x] Yes         [] No, pt required further clarification.        Post Treatment Pain:  3/10      Plan  Times per week: 3  Plan weeks: 6      Goals  (Total # of Visits to Date: 4)      Short term goals  Time Frame for Short term goals: 3 weeks  Short term goal 1: Pt to be instructed in home program.--met  Short term goal 2: Pt to be lacking no greater than 5 degrees of left knee ext to assist with proper gait. --progressing  Short term goal 3: Pt to achieve 90 degrees of left knee flexion to assist with ADL's.--met    Long term goals  Time Frame for Long term goals : 6 weeks  Long term goal 1: Pt to report independence and compliance with home program.  Long term goal 2: Pt to achieve 120 degrees of left knee flexion to assist with work related tasks. Long term goal 3: Pt to be lacking no greater than 1 degree of left knee ext to assist with proper gait. Long term goal 4: Pt to demonstrate 4+/5 strength of left quad to assist with functional tasks. Long term goal 5: Pt to ascend/descend 6 inch steps without use of handrail.     Minutes Tracking:  Time In: 1361  Time Out: [address] [facility] [place]  Minutes: 62  Timed Code Treatment Minutes: 60 Minutes    Treatment Charges: Code Total Mins Units Time In/Out   [] Evaluation       []  Low       []  Moderate       []  High  [] Re-Evaluation   13469  90630  27377  70660      [x]  Ther Exercise 86617 81 0 7101-9145   []  Manual Therapy 61066       [x]  Unattend Estim 45828 99 1 2011-9181   []  Ultrasound 11650      []  Iontophoresis 11444      []  Neuro Kevin 28792      []  Aquatics 83491      []  Traction 13633      []  Work Hardening 56220      []  FCE 01194      Total Treatment time  60 min           Soren Ordonez PT, DPT, CMPT      Date: 11/25/2020

## 2020-11-30 ENCOUNTER — HOSPITAL ENCOUNTER (OUTPATIENT)
Dept: PHYSICAL THERAPY | Age: 45
Setting detail: THERAPIES SERIES
Discharge: HOME | End: 2020-11-30
Payer: COMMERCIAL

## 2020-11-30 PROCEDURE — 97110 THERAPEUTIC EXERCISES: CPT

## 2020-11-30 PROCEDURE — G0283 ELEC STIM OTHER THAN WOUND: HCPCS
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
## 2020-12-02 ENCOUNTER — HOSPITAL ENCOUNTER (OUTPATIENT)
Dept: PHYSICAL THERAPY | Age: 45
Setting detail: THERAPIES SERIES
Discharge: HOME | End: 2020-12-02
Payer: COMMERCIAL

## 2020-12-02 PROCEDURE — 97140 MANUAL THERAPY 1/> REGIONS: CPT

## 2020-12-02 PROCEDURE — 97110 THERAPEUTIC EXERCISES: CPT

## 2020-12-02 PROCEDURE — G0283 ELEC STIM OTHER THAN WOUND: HCPCS

## 2020-12-02 NOTE — PROGRESS NOTES
Phone: Cristobal           Fax: [phone]                           Outpatient Physical Therapy                                                                            Daily Note    Patient: Jamison Torres : 1975  CSN #: [id number]   Referring Practitioner:  Graham Laguna MD    Referral Date : 20     Date: 2020    Diagnosis: Other tear of medial meniscus left knee, S83.242A  Treatment Diagnosis: left knee pain, difficulty walking    Onset Date: 20  PT Insurance Information: [id number] [facility] -  visits  Total # of Visits Approved: 12 Per Physician Order  Total # of Visits to Date: 6  No Show: 0  Canceled Appointment: 0      Pre-Treatment Pain:  3/10  Subjective: Pt. reprots pain 3/10 upon arrival and stated he had pain post last treatment. Exercises:  Exercise 1: **HEP - heel prop, quad sets, sitting knee flex; calf stretch  Exercise 2: heel prop - 30s x 3  Exercise 3: quad sets on towel - 5sec 10x  Exercise 4: sitting knee flex 5x20\" hold ea  Exercise 5: Bike 10 min, hills lv 3  Exercise 6: Sink exercise  Exercise 7: step lunge 10x hold 10 sec  Exercise 8: LAQ x10  Exercise 9: Heel slide 10x10\"  Exercise 10: step stretch flex and ext - 20s 5x each  Exercise 11: SLR x10-- Zita from therapist    Manual:  Joint mobilization: Patellar mobs  PROM: Right knee seated and supine    Modalities:  Cryotherapy (Minutes\Location): 15mins with IFC  E-stim (parameters): IFC for pain and swelling       Assessment  Assessment: L quad strength in seated 4/5. Activity Tolerance  Activity Tolerance: Patient Tolerated treatment well    Patient Education  Patient Education: SAQ to improve L quad strength  Pt verbalized/demonstrated good understanding:     [x] Yes         [] No, pt required further clarification.        Post Treatment Pain:  1/10      Plan  Times per week: 3  Plan weeks: 6      Goals  (Total # of Visits to Date: 6)      Short term goals  Time Frame for Short term goals: 3 weeks  Short term goal 1: Pt to be instructed in home program.--met  Short term goal 2: Pt to be lacking no greater than 5 degrees of left knee ext to assist with proper gait- MET  Short term goal 3: Pt to achieve 90 degrees of left knee flexion to assist with ADL's.--met    Long term goals  Time Frame for Long term goals : 6 weeks  Long term goal 1: Pt to report independence and compliance with home program.  Long term goal 2: Pt to achieve 120 degrees of left knee flexion to assist with work related tasks. Long term goal 3: Pt to be lacking no greater than 1 degree of left knee ext to assist with proper gait. Long term goal 4: Pt to demonstrate 4+/5 strength of left quad to assist with functional tasks. Long term goal 5: Pt to ascend/descend 6 inch steps without use of handrail.     Minutes Tracking:  Time In: 3395  Time Out: [address]  Minutes: 62  Timed Code Treatment Minutes: 60 Minutes    Treatment Charges: Code Total Mins Units Time In/Out   [] Evaluation       []  Low       []  Moderate       []  High  [] Re-Evaluation   28525  53260  02639  89171      [x]  Ther Exercise 82518 35 2 [phone]   [x]  Manual Therapy 40301 11 1 3406-3887   [x]  Unattend Estim 35973 15 1 8836-9840   []  Ultrasound 45224      []  Iontophoresis 01032      []  Neuro Kevin 73776      []  Aquatics 97436      []  Traction 32388      []  Work Hardening 94035      []  FCE 83281      Total Treatment time  60 min          Naye Whalen     Date: 12/2/2020

## 2020-12-07 ENCOUNTER — HOSPITAL ENCOUNTER (OUTPATIENT)
Dept: PHYSICAL THERAPY | Age: 45
Setting detail: THERAPIES SERIES
Discharge: HOME | End: 2020-12-07
Payer: COMMERCIAL

## 2020-12-07 PROCEDURE — 97110 THERAPEUTIC EXERCISES: CPT

## 2020-12-07 PROCEDURE — G0283 ELEC STIM OTHER THAN WOUND: HCPCS

## 2020-12-07 NOTE — PROGRESS NOTES
Phone: Cristobal           Fax: [phone]                           Outpatient Physical Therapy                                                                            Daily Note    Patient: Gricelda Beyer : 1975  CSN #: [id number]   Referring Practitioner:  Donte Saunders MD    Referral Date : 20     Date: 2020    Diagnosis: Other tear of medial meniscus left knee, S83.242A  Treatment Diagnosis: left knee pain, difficulty walking    Onset Date: 20  PT Insurance Information: [facility] - 12 visits  Total # of Visits Approved: 12 Per Physician Order  Total # of Visits to Date: 7  No Show: 0  Canceled Appointment: 0      Pre-Treatment Pain:  2-3/10  Subjective: Pt states he was doing a lot over the weekend so having more stiffness and pain today. Exercises:  Exercise 5: Bike 10 min, hills lv 3  Exercise 7: step lunge 10x hold 10 sec  Exercise 9: Heel slide 10x10\"  Exercise 10: step stretch flex and ext - 20s 5x each  Exercise 12: FSU 6in x10  Exercise 13: SL HS curl startrac x10 35#; SL knee ext 25# x10  Exercise 14: Sit to stands x10 -- 1 UE        Modalities:  Cryotherapy (Minutes\Location): 15mins with IFC  E-stim (parameters): IFC for pain and swelling       Assessment  Assessment: L knee flexion AAROM reaching 111* at end of tx this date. Progressed LE strengthening this date, which pt demonstrates difficulty with step ups and sit to stands d/t L quad weakness, and demonstrates moderate compensation. IFC and CP applied at end of tx for pain. Will progress as tolerated. Activity Tolerance  Activity Tolerance: Patient Tolerated treatment well    Patient Education  Patient Education: Exercise progression. Pt verbalized/demonstrated good understanding:     [x] Yes         [] No, pt required further clarification.        Post Treatment Pain:  2-3/10      Plan  Times per week: 3  Plan weeks: 6      Goals  (Total # of Visits to Date: 7)      Short term goals  Time Frame for Short term goals: 3 weeks  Short term goal 1: Pt to be instructed in home program.--met  Short term goal 2: Pt to be lacking no greater than 5 degrees of left knee ext to assist with proper gait- MET  Short term goal 3: Pt to achieve 90 degrees of left knee flexion to assist with ADL's.--met    Long term goals  Time Frame for Long term goals : 6 weeks  Long term goal 1: Pt to report independence and compliance with home program.  Long term goal 2: Pt to achieve 120 degrees of left knee flexion to assist with work related tasks. Long term goal 3: Pt to be lacking no greater than 1 degree of left knee ext to assist with proper gait. Long term goal 4: Pt to demonstrate 4+/5 strength of left quad to assist with functional tasks. Long term goal 5: Pt to ascend/descend 6 inch steps without use of handrail.     Minutes Tracking:  Time In: 0920  Time Out: [address]  Minutes: 63  Timed Code Treatment Minutes: 60 Minutes      Treatment Charges: Code Total Mins Units Time In/Out   [] Evaluation       []  Low       []  Moderate       []  High  [] Re-Evaluation   38134  85116  48839  20101      [x]  Ther Exercise 49374 37 0 [phone]   []  Manual Therapy 94537        [x]  Unattend Estim 39796 61 9 [phone]   []  Ultrasound 82708      []  Iontophoresis 41853      []  Neuro Kevin 82441      []  Aquatics 71809      []  Traction 97313      []  Work Hardening 00192      []  FCE 12890      Total Treatment time  60 min             Albin Cordova PT, DPT     Date: 12/7/2020

## 2020-12-09 ENCOUNTER — HOSPITAL ENCOUNTER (OUTPATIENT)
Dept: PHYSICAL THERAPY | Age: 45
Setting detail: THERAPIES SERIES
Discharge: HOME | End: 2020-12-09
Payer: COMMERCIAL

## 2020-12-09 PROCEDURE — 97110 THERAPEUTIC EXERCISES: CPT

## 2020-12-09 PROCEDURE — G0283 ELEC STIM OTHER THAN WOUND: HCPCS

## 2020-12-09 NOTE — PROGRESS NOTES
[facility]  Inpatient/Observation/Outpatient Rehabilitation    Date: 2020  Patient Name: Jason Garcia       [] Inpatient Acute/Observation       []  Outpatient  : 1975       [x] Pt no showed for scheduled appointment    [] Pt refused/declined therapy at this time due to:           [] Pt cancelled due to:  [] No Reason Given   [] Sick/ill   [] Other:    Will attempt evaluation at our earliest opportunity.        Ana Merrill Date: 2020

## 2020-12-10 NOTE — PROGRESS NOTES
Phone: Cristobal           Fax: [phone]                           Outpatient Physical Therapy                                                                            Daily Note    Patient: Ilene Lopez : 1975  CSN #: [id number]   Referring Practitioner:  Sandip Smith MD    Referral Date : 20     Date: 2020    Diagnosis: Other tear of medial meniscus left knee, S83.242A  Treatment Diagnosis: left knee pain, difficulty walking    Onset Date: 20  PT Insurance Information: [id number] [place] [address] visits  Total # of Visits Approved: 12 Per Physician Order  Total # of Visits to Date: 8  No Show: 0  Canceled Appointment: 0      Pre-Treatment Pain:  2/10  Subjective: Pt had appointment times mixed up today and arrived to appointment 2 hours late. Pt states pain today rates 2/10. Still not walking like he should but states knee still wants to give out on him at times. Exercises:  Exercise 5: Bike 8 min, hills lv 4.0  Exercise 12: FSU and lat 6in x15  Exercise 14: Sit to stands x10 -- 1 UE  Exercise 15: SLS 3x 30 sec airex  Exercise 16: 6 inch aldo - ant and lat 15x  Exercise 17: single leg press 2x10 - 4 plates      Modalities:  Cryotherapy (Minutes\Location): 15mins with IFC  E-stim (parameters): IFC for pain and swelling       Assessment  Assessment: Progressed ex this date to increase functional strength of left LE. Limited ex due to pt being late for appointment. Will continue to progress as pt tolerates. Activity Tolerance  Activity Tolerance: Patient Tolerated treatment well    Patient Education  Patient Education: progression of strengthening  Pt verbalized/demonstrated good understanding:     [x] Yes         [] No, pt required further clarification.        Post Treatment Pain:  2/10      Plan  Times per week: 3  Plan weeks: 6      Goals  (Total # of Visits to Date: 8)      Short term goals  Time Frame for Short term goals: 3 weeks  Short term goal 1: Pt to be instructed in home program.--met  Short term goal 2: Pt to be lacking no greater than 5 degrees of left knee ext to assist with proper gait- MET  Short term goal 3: Pt to achieve 90 degrees of left knee flexion to assist with ADL's.--met    Long term goals  Time Frame for Long term goals : 6 weeks  Long term goal 1: Pt to report independence and compliance with home program.  Long term goal 2: Pt to achieve 120 degrees of left knee flexion to assist with work related tasks. Long term goal 3: Pt to be lacking no greater than 1 degree of left knee ext to assist with proper gait. Long term goal 4: Pt to demonstrate 4+/5 strength of left quad to assist with functional tasks. Long term goal 5: Pt to ascend/descend 6 inch steps without use of handrail.     Minutes Tracking:  Time In: 1986  Time Out: [address]  Minutes: 49  Timed Code Treatment Minutes: 47 Minutes    Treatment Charges: Code Total Mins Units Time In/Out   [] Evaluation       []  Low       []  Moderate       []  High  [] Re-Evaluation   32301  00599  87332  22738      [x]  Ther Exercise 85036 91 2 [phone]   []  Manual Therapy 05858        [x]  Unattend Estim 86799 15 1 5868-7435   []  Ultrasound 85478      []  Iontophoresis 53856      []  Neuro Kevin 01333      []  Aquatics 35986      []  Traction 27952      []  Work Hardening 50850      []  FCE 72882      Total Treatment time  52 min         Kasandra Devine PT, DPT, CMPT      Date: 12/9/2020

## 2020-12-14 ENCOUNTER — HOSPITAL ENCOUNTER (OUTPATIENT)
Dept: PHYSICAL THERAPY | Age: 45
Setting detail: THERAPIES SERIES
Discharge: HOME | End: 2020-12-14
Payer: COMMERCIAL

## 2020-12-14 PROCEDURE — G0283 ELEC STIM OTHER THAN WOUND: HCPCS

## 2020-12-14 PROCEDURE — 97110 THERAPEUTIC EXERCISES: CPT

## 2020-12-14 PROCEDURE — 97140 MANUAL THERAPY 1/> REGIONS: CPT

## 2020-12-14 NOTE — PROGRESS NOTES
Phone: Cristobal           Fax: [phone]                           Outpatient Physical Therapy                                                                            Daily Note    Patient: Radha Black : 1975  CSN #: [id number]   Referring Practitioner:  Shira Morgan MD    Referral Date : 20     Date: 2020    Diagnosis: Other tear of medial meniscus left knee, S83.242A       Onset Date: 20  PT Insurance Information: [facility] - 12 visits  Total # of Visits Approved: 12 Per Physician Order  Total # of Visits to Date: 9  No Show: 0      Pre-Treatment Pain:  3/10  Subjective: States pain is 3/10, states his knee is a liitlle more sore due to being on it more    Exercises:  Exercise 5: Bike 10 min, hills lv 4.0  Exercise 10: step stretch flex and ext - 20s 5x each  Exercise 12: FSU/SSU  6in x 20  Exercise 14: Sit to stands 2x10 -- 1 UE  Exercise 17: single leg press 2x10 - 5 plates    Manual:  Joint mobilization: Patellar mobs  PROM: L knee seated and supine    Modalities:  Cryotherapy (Minutes\Location): 15mins with IFC  E-stim (parameters): IFC for pain and swelling       Assessment  Assessment: Pt moving a little slow today due to soreness in knee. Mild progressions in wts and sets tolerated. Flexion 120 supine with 0 deg extension. IFC/cp for pain following session    Activity Tolerance  Activity Tolerance: Patient Tolerated treatment well    Patient Education  Patient Education: HEP  Pt verbalized/demonstrated good understanding:     [x] Yes         [] No, pt required further clarification.        Post Treatment Pain:  3/10      Plan  Times per week: 3  Plan weeks: 6      Goals  (Total # of Visits to Date: 5)      Short term goals  Time Frame for Short term goals: 3 weeks  Short term goal 1: Pt to be instructed in home program.--met  Short term goal 2: Pt to be lacking no greater than 5 degrees of left knee ext to assist with proper gait-

## 2020-12-16 ENCOUNTER — HOSPITAL ENCOUNTER (OUTPATIENT)
Dept: PHYSICAL THERAPY | Age: 45
Setting detail: THERAPIES SERIES
Discharge: HOME | End: 2020-12-16
Payer: COMMERCIAL

## 2020-12-16 PROCEDURE — G0283 ELEC STIM OTHER THAN WOUND: HCPCS

## 2020-12-16 PROCEDURE — 97110 THERAPEUTIC EXERCISES: CPT

## 2020-12-16 PROCEDURE — 97140 MANUAL THERAPY 1/> REGIONS: CPT

## 2020-12-16 NOTE — PROGRESS NOTES
Phone: Cristobal           Fax: [phone]                           Outpatient Physical Therapy                                                                            Daily Note    Patient: Jesse Peters : 1975  CSN #: [id number]   Referring Practitioner:  Elenita Lugo MD    Referral Date : 20     Date: 2020    Diagnosis: Other tear of medial meniscus left knee, S83.242A  Treatment Diagnosis: left knee pain, difficulty walking    Onset Date: 20  PT Insurance Information: [id number] [facility] -  visits    Per Physician Order  Total # of Visits to Date: 10  No Show: 0  Canceled Appointment: 0      Pre-Treatment Pain:  4/10  Subjective: Pain 4/10, states pain is medial knee    Exercises:  Exercise 5: Bike 10 min, hills lv 4.0  Exercise 10: step stretch flex and ext - 20s 5x each  Exercise 12: FSU/SSU  8in x 20,  6\" SD 15x  Exercise 13: SL HS curl startrac x10 45#; SL knee ext 35# x10  Exercise 14: Sit to stands 2x10 -- 1 UE  Exercise 17: single LEG PRESS 2x10 - 5 plates  Exercise 18: keisre retro walks 25# 15x    Manual:  PROM: L knee prone    Modalities:  Cryotherapy (Minutes\Location): 15mins with IFC  E-stim (parameters): IFC for pain and swelling       Assessment  Assessment: Pain 4/10, states knee feels a little tight and swollen. PROM remains 120 prone with full extension noted. Added step downs and Nain reto walks at 25#. IFC/cp for swelling and pain. Activity Tolerance  Activity Tolerance: Patient Tolerated treatment well    Patient Education     Pt verbalized/demonstrated good understanding:     [x] Yes         [] No, pt required further clarification.        Post Treatment Pain:  4/10      Plan  Times per week: 3  Plan weeks: 6      Goals  (Total # of Visits to Date: 10)      Short term goals  Time Frame for Short term goals: 3 weeks  Short term goal 1: Pt to be instructed in home program.--met  Short term goal 2: Pt to be lacking no greater than 5 degrees of left knee ext to assist with proper gait- MET  Short term goal 3: Pt to achieve 90 degrees of left knee flexion to assist with ADL's.--met    Long term goals  Time Frame for Long term goals : 6 weeks  Long term goal 1: Pt to report independence and compliance with home program.  Long term goal 2: Pt to achieve 120 degrees of left knee flexion to assist with work related tasks. Long term goal 3: Pt to be lacking no greater than 1 degree of left knee ext to assist with proper gait. Long term goal 4: Pt to demonstrate 4+/5 strength of left quad to assist with functional tasks. Long term goal 5: Pt to ascend/descend 6 inch steps without use of handrail.     Minutes Tracking:  Time In: 0830  Time Out: 0940  Minutes: 70  Timed Code Treatment Minutes: 68 Minutes  Treatment Charges: Code Total Mins Units Time In/Out   [] Evaluation       []  Low       []  Moderate       []  High  [] Re-Evaluation   78030  79282  63567  06342      [x]  Ther Exercise 17524 35 2 [phone]   [x]  Manual Therapy 43964 10  1  905-915   [x]  Unattend Estim 49505 38 4 [phone]   []  Ultrasound 01857      []  Iontophoresis 13314      []  Neuro Kevin 86886      []  Aquatics 87916      []  Traction 24324      []  Work Hardening 31575      []  FCE 74516      Total Treatment time  60 min 4           Oswaldo Sheehan     Date: 12/16/2020

## 2020-12-21 ENCOUNTER — HOSPITAL ENCOUNTER (OUTPATIENT)
Dept: PHYSICAL THERAPY | Age: 45
Setting detail: THERAPIES SERIES
Discharge: HOME | End: 2020-12-21
Payer: COMMERCIAL

## 2020-12-21 PROCEDURE — 97140 MANUAL THERAPY 1/> REGIONS: CPT

## 2020-12-21 PROCEDURE — 97110 THERAPEUTIC EXERCISES: CPT

## 2020-12-21 PROCEDURE — G0283 ELEC STIM OTHER THAN WOUND: HCPCS

## 2020-12-21 NOTE — PROGRESS NOTES
weeks  Short term goal 1: Pt to be instructed in home program.--met  Short term goal 2: Pt to be lacking no greater than 5 degrees of left knee ext to assist with proper gait- MET  Short term goal 3: Pt to achieve 90 degrees of left knee flexion to assist with ADL's.--met**    Long term goals  Time Frame for Long term goals : 6 weeks  Long term goal 1: Pt to report independence and compliance with home program.  Long term goal 2: Pt to achieve 120 degrees of left knee flexion to assist with work related tasks. Long term goal 3: Pt to be lacking no greater than 1 degree of left knee ext to assist with proper gait. Long term goal 4: Pt to demonstrate 4+/5 strength of left quad to assist with functional tasks. Long term goal 5: Pt to ascend/descend 6 inch steps without use of handrail.     Minutes Tracking:  Time In: 0831  Time Out: 0940  Minutes: 69  Timed Code Treatment Minutes: 68 Minutes  Treatment Charges: Code Total Mins Units Time In/Out   [] Evaluation       []  Low       []  Moderate       []  High  [] Re-Evaluation   24434  14086  52297  78630      [x]  Ther Exercise 24622 51 2 [phone]   [x]  Manual Therapy 70036 12  1  080-860   [x]  Unattend Estim 84955 15 1 [phone]   []  Ultrasound 20858      []  Iontophoresis 25136      []  Neuro Kevin 90803      []  Aquatics 27392      []  Traction 53958      []  Work Hardening 05373      []  FCE 68611      Total Treatment time  58 min 4         Oswaldo Diallo PTA     Date: 12/21/2020

## 2020-12-23 ENCOUNTER — HOSPITAL ENCOUNTER (OUTPATIENT)
Dept: PHYSICAL THERAPY | Age: 45
Setting detail: THERAPIES SERIES
Discharge: HOME | End: 2020-12-23
Payer: COMMERCIAL

## 2020-12-23 PROCEDURE — 97032 APPL MODALITY 1+ESTIM EA 15: CPT

## 2020-12-23 PROCEDURE — 97035 APP MDLTY 1+ULTRASOUND EA 15: CPT

## 2020-12-23 PROCEDURE — 97110 THERAPEUTIC EXERCISES: CPT

## 2020-12-23 NOTE — PLAN OF CARE
[facility]           Phone: [phone]             Outpatient Physical Therapy  Fax: [phone]                                           Date: 2020  Patient: Colette Rosas : 1975 CSN #: [id number]   Referring Practitioner:  Marco Hoffman MD Referral Date:  20       [] Plan of Care   [x] Updated Plan of Care    Dates of Service to Include: 2020 to 21    Diagnosis:  Other tear of medial meniscus left knee, S83.242A    Rehab (Treatment) Diagnosis:  left knee pain, difficulty walking             Onset Date:  20    Attendance  Total # of Visits to Date: 12 No Show: 0 Canceled Appointment: 0    Assessment  Assessment: Pt has completed 12 PT visits following left knee scope. ROM of left knee is currently 0-108 degrees. Circumfrance infrapatella: left - 41.5, right - 38.5. Will continue PT in order to progress toward long term goals. Goals  Short term goals  Time Frame for Short term goals: 3 weeks  Short term goal 1: Pt to be instructed in home program.--met  Short term goal 2: Pt to be lacking no greater than 5 degrees of left knee ext to assist with proper gait- MET  Short term goal 3: Pt to achieve 90 degrees of left knee flexion to assist with ADL's.--met  Long term goals  Time Frame for Long term goals : 6 weeks  Long term goal 1: Pt to report independence and compliance with home program.  Long term goal 2: Pt to achieve 120 degrees of left knee flexion to assist with work related tasks. Long term goal 3: Pt to be lacking no greater than 1 degree of left knee ext to assist with proper gait. Long term goal 4: Pt to demonstrate 4+/5 strength of left quad to assist with functional tasks. Long term goal 5: Pt to ascend/descend 6 inch steps without use of handrail.      Prognosis  Prognosis: Good    Treatment Plan   Times per week: 3  Plan weeks: 6  [x] HP/CP      [x] Electrical Stim   [x] Therapeutic Exercise      [] Gait Training  [] Aquatics   [x] Ultrasound         [x] Patient Education/HEP   [x] Manual Therapy  [] Traction    [x] Neuro-homar        [x] Soft Tissue Mobs            [] Home TENS  [] Iontophoresis    [] Orthotic casting/fitting      [] Dry Needling             Electronically signed by: Farhan Taylor PT, DPT, CMPT    Date: 12/23/2020      ______________________________________ Date: 12/23/2020   Physician Signature

## 2020-12-23 NOTE — PROGRESS NOTES
Phone: Cristobal           Fax: [phone]                           Outpatient Physical Therapy                                                                            Daily Note    Patient: Tu Brown : 1975  CSN #: [id number]   Referring Practitioner:  Megan Salinas MD    Referral Date : 20     Date: 2020    Diagnosis: Other tear of medial meniscus left knee, S83.242A  Treatment Diagnosis: left knee pain, difficulty walking    Onset Date: 20  PT Insurance Information: [facility] - 12 visits, +12 visits  Total # of Visits Approved: 24 Per Physician Order  Total # of Visits to Date: 12  No Show: 0  Canceled Appointment: 0      Pre-Treatment Pain:  4/10  Subjective: Pain remains about 4/10. Swelling med knee remains the same. Pt states 12 more PT visits were ordered. Also states he believes some imaging was ordered and waiting for approval. Noted muscle pain and some knee pain the day after PT sessions. Exercises:  Exercise 5: Bike 10 min, hills lv 4.0  Exercise 13: SL HS curl startrac x10 45#; SL knee ext 35# x10  Exercise 14: Sit to stands 2x10 --  8#  Exercise 15: SLS 3x 30 sec airex  Exercise 17: single LEG PRESS 2x10 - 5 plates    Modalities:  Cryotherapy (Minutes\Location): 15mins with IFC  Ultrasound: 8 mins to med knee at 0.8 and 10%  E-stim (parameters): IFC for pain and swelling       Assessment  Assessment: Pt has completed 12 PT visits following left knee scope. ROM of left knee is currently 0-108 degrees. Circumfrance infrapatella: left - 41.5, right - 38.5. Will continue PT in order to progress toward long term goals. Activity Tolerance  Activity Tolerance: Patient Tolerated treatment well    Patient Education  Patient Education: Josr Wong  Pt verbalized/demonstrated good understanding:     [x] Yes         [] No, pt required further clarification.        Post Treatment Pain:  4/10      Plan  Times per week: 3  Plan weeks: 6      Goals  (Total # of Visits to Date: 15)      Short term goals  Time Frame for Short term goals: 3 weeks  Short term goal 1: Pt to be instructed in home program.--met  Short term goal 2: Pt to be lacking no greater than 5 degrees of left knee ext to assist with proper gait- MET  Short term goal 3: Pt to achieve 90 degrees of left knee flexion to assist with ADL's.--met    Long term goals  Time Frame for Long term goals : 6 weeks  Long term goal 1: Pt to report independence and compliance with home program.  Long term goal 2: Pt to achieve 120 degrees of left knee flexion to assist with work related tasks. Long term goal 3: Pt to be lacking no greater than 1 degree of left knee ext to assist with proper gait. Long term goal 4: Pt to demonstrate 4+/5 strength of left quad to assist with functional tasks. Long term goal 5: Pt to ascend/descend 6 inch steps without use of handrail.     Minutes Tracking:  Time In: 1003  Time Out: [address]  Minutes: 66  Timed Code Treatment Minutes: 56 Minutes      Treatment Charges: Code Total Mins Units Time In/Out   [] Evaluation       []  Low       []  Moderate       []  High  [] Re-Evaluation   65639  27137  41494  30816      [x]  Ther Exercise 69752 13 2 0923-9957   []  Manual Therapy 29082        [x]  Unattend Estim 33587 81 1 [phone]   [x]  Ultrasound 37875 5 4 8698-5200   []  Iontophoresis 38199      []  Neuro Kevin 30966      []  Aquatics 47746      []  Traction 30975      []  Work Hardening 18099      []  FCE 94738      Total Treatment time  56 min          Kasandra Devine PT, DPT, CMPT      Date: 12/23/2020

## 2021-01-05 ENCOUNTER — HOSPITAL ENCOUNTER (OUTPATIENT)
Dept: PHYSICAL THERAPY | Age: 46
Setting detail: THERAPIES SERIES
Discharge: HOME | End: 2021-01-05
Payer: COMMERCIAL

## 2021-01-05 PROCEDURE — 97140 MANUAL THERAPY 1/> REGIONS: CPT

## 2021-01-05 PROCEDURE — 97035 APP MDLTY 1+ULTRASOUND EA 15: CPT

## 2021-01-05 PROCEDURE — 97110 THERAPEUTIC EXERCISES: CPT

## 2021-01-05 PROCEDURE — G0283 ELEC STIM OTHER THAN WOUND: HCPCS

## 2021-01-05 NOTE — PROGRESS NOTES
weeks: 6      Goals  (Total # of Visits to Date: 15)      Short term goals  Time Frame for Short term goals: 3 weeks  Short term goal 1: Pt to be instructed in home program.--met  Short term goal 2: Pt to be lacking no greater than 5 degrees of left knee ext to assist with proper gait- MET  Short term goal 3: Pt to achieve 90 degrees of left knee flexion to assist with ADL's.--met    Long term goals  Time Frame for Long term goals : 6 weeks  Long term goal 1: Pt to report independence and compliance with home program.  Long term goal 2: Pt to achieve 120 degrees of left knee flexion to assist with work related tasks. Long term goal 3: Pt to be lacking no greater than 1 degree of left knee ext to assist with proper gait. Long term goal 4: Pt to demonstrate 4+/5 strength of left quad to assist with functional tasks. Long term goal 5: Pt to ascend/descend 6 inch steps without use of handrail.     Minutes Tracking:  Time In: 0725  Time Out: [facility]  Minutes: 71  Timed Code Treatment Minutes: 70 Minutes  Treatment Charges: Code Total Mins Units Time In/Out   [] Evaluation       []  Low       []  Moderate       []  High  [] Re-Evaluation   06236  21818  57671  65679      [x]  Ther Exercise 47407 58 2 [phone]   [x]  Manual Therapy 73826 8   1 [phone]   [x]  Unattend Estim 25124 44 1 [phone]   [x]  Ultrasound 83914 7 4 [phone]   []  Iontophoresis 29529      []  Neuro Kevin 74231      []  Aquatics 45379      []  Traction 13118      []  Work Hardening 35037      []  FCE 51053      Total Treatment time  66 min 5           Oswaldo Sheehan     Date: 1/5/2021

## 2021-01-06 ENCOUNTER — HOSPITAL ENCOUNTER (OUTPATIENT)
Dept: PHYSICAL THERAPY | Age: 46
Setting detail: THERAPIES SERIES
Discharge: HOME | End: 2021-01-06
Payer: COMMERCIAL

## 2021-01-06 PROCEDURE — 97035 APP MDLTY 1+ULTRASOUND EA 15: CPT

## 2021-01-06 PROCEDURE — 97110 THERAPEUTIC EXERCISES: CPT

## 2021-01-06 PROCEDURE — 97140 MANUAL THERAPY 1/> REGIONS: CPT

## 2021-01-06 PROCEDURE — G0283 ELEC STIM OTHER THAN WOUND: HCPCS

## 2021-01-06 NOTE — PROGRESS NOTES
3/10      Plan  Times per week: 3  Plan weeks: 6      Goals  (Total # of Visits to Date: 15)      Short term goals  Time Frame for Short term goals: 3 weeks  Short term goal 1: Pt to be instructed in home program.--met  Short term goal 2: Pt to be lacking no greater than 5 degrees of left knee ext to assist with proper gait- MET  Short term goal 3: Pt to achieve 90 degrees of left knee flexion to assist with ADL's.--met    Long term goals  Time Frame for Long term goals : 6 weeks  Long term goal 1: Pt to report independence and compliance with home program.  Long term goal 2: Pt to achieve 120 degrees of left knee flexion to assist with work related tasks. Long term goal 3: Pt to be lacking no greater than 1 degree of left knee ext to assist with proper gait. Long term goal 4: Pt to demonstrate 4+/5 strength of left quad to assist with functional tasks.     Minutes Tracking:  Time In: 0740  Time Out: 2467  Minutes: 74  Timed Code Treatment Minutes: 72 Minutes  Treatment Charges: Code Total Mins Units Time In/Out   [] Evaluation       []  Low       []  Moderate       []  High  [] Re-Evaluation   61460  46408  99735  54312      [x]  Ther Exercise 14505 11 2 [phone]   [x]  Manual Therapy 21675 9  1  627-112   [x]  Unattend Estim 99938 08 1 [phone]   [x]  Ultrasound 97390 1 0 [phone]   []  Iontophoresis 67353      []  Neuro Kevin 73448      []  Aquatics 75229      []  Traction 10790      []  Work Hardening 55086      []  FCE 46914      Total Treatment time  71 min            Oswaldo Diallo PTA     Date: 1/6/2021

## 2021-01-11 ENCOUNTER — HOSPITAL ENCOUNTER (OUTPATIENT)
Dept: PHYSICAL THERAPY | Age: 46
Setting detail: THERAPIES SERIES
Discharge: HOME | End: 2021-01-11
Payer: COMMERCIAL

## 2021-01-11 PROCEDURE — 97035 APP MDLTY 1+ULTRASOUND EA 15: CPT

## 2021-01-11 PROCEDURE — 97110 THERAPEUTIC EXERCISES: CPT

## 2021-01-11 PROCEDURE — G0283 ELEC STIM OTHER THAN WOUND: HCPCS

## 2021-01-11 NOTE — PROGRESS NOTES
Phone: Cristobal           Fax: [phone]                           Outpatient Physical Therapy                                                                            Daily Note    Patient: Sukhdev Jones : 1975  CSN #: [id number]   Referring Practitioner:  Dom House MD    Referral Date : 20     Date: 2021    Diagnosis: Other tear of medial meniscus left knee, S83.242A  Treatment Diagnosis: left knee pain, difficulty walking    Onset Date: 20  PT Insurance Information: [facility] - 12 visits, +12 visits  Total # of Visits Approved: 24 Per Physician Order  Total # of Visits to Date: 15  No Show: 0  Canceled Appointment: 0      Pre-Treatment Pain:  ~3/10  Subjective: Pt states he feels the same, doesn't think things are getting much better. Pt states more achiness than pain. Exercises:  Exercise 5: Bike 10 min, hills lv 4.0  Exercise 7: step lunge 10x hold 10 sec  Exercise 10: step stretch flex and ext - 20s 5x each  Exercise 12: FSU/SSU  8in x 20,  8\" SD 15x  Exercise 14: Sit to stands 2x10 --  8#  Exercise 17: single LEG PRESS 2x10 - 5 plates      Modalities:  Cryotherapy (Minutes\Location): 15mins with IFC  Ultrasound: 8 mins to med knee at 0.8 and 10%  E-stim (parameters): IFC for pain and swelling       Assessment  Assessment: Cont with US to medial knee joint to assist with decreasing edema and promote healing. Exercises performed with fair tolerance, minimal progressions made to exercise program this date. IFC and CP applied at end of tx for pain and edema. Will progress as tolerated. Activity Tolerance  Activity Tolerance: Patient Tolerated treatment well    Patient Education   Patient Education: Reviewed proper form with exercises. Pt verbalized/demonstrated good understanding:     [x] Yes         [] No, pt required further clarification.        Post Treatment Pain:  3/10      Plan  Times per week: 3  Plan weeks: 6      Goals (Total # of Visits to Date: 13)      Short term goals  Time Frame for Short term goals: 3 weeks  Short term goal 1: Pt to be instructed in home program.--met  Short term goal 2: Pt to be lacking no greater than 5 degrees of left knee ext to assist with proper gait- MET  Short term goal 3: Pt to achieve 90 degrees of left knee flexion to assist with ADL's.--met    Long term goals  Time Frame for Long term goals : 6 weeks  Long term goal 1: Pt to report independence and compliance with home program.  Long term goal 2: Pt to achieve 120 degrees of left knee flexion to assist with work related tasks. Long term goal 3: Pt to be lacking no greater than 1 degree of left knee ext to assist with proper gait. Long term goal 4: Pt to demonstrate 4+/5 strength of left quad to assist with functional tasks. Long term goal 5: Pt to ascend/descend 6 inch steps without use of handrail.     Minutes Tracking:  Time In: 6076  Time Out: [address]  Minutes: 62  Timed Code Treatment Minutes: 58 Minutes      Treatment Charges: Code Total Mins Units Time In/Out   [] Evaluation       []  Low       []  Moderate       []  High  [] Re-Evaluation   10446  35318  58296  64079      [x]  Ther Exercise 64920 39 2 [phone]   []  Manual Therapy 42930        [x]  Unattend Estim 29333 15 1 [phone]   [x]  Ultrasound 19994 5 4 [phone]   []  Iontophoresis 49501      []  Neuro Kevin 07615      []  Aquatics 62613      []  Traction 19523      []  Work Hardening 58376      []  FCE 05865      Total Treatment time  62 min             Luis Fernando Bryan, PT, DPT     Date: 1/11/2021

## 2021-01-13 ENCOUNTER — HOSPITAL ENCOUNTER (OUTPATIENT)
Dept: PHYSICAL THERAPY | Age: 46
Setting detail: THERAPIES SERIES
Discharge: HOME | End: 2021-01-13
Payer: COMMERCIAL

## 2021-01-13 PROCEDURE — 97035 APP MDLTY 1+ULTRASOUND EA 15: CPT

## 2021-01-13 PROCEDURE — 97110 THERAPEUTIC EXERCISES: CPT

## 2021-01-13 PROCEDURE — G0283 ELEC STIM OTHER THAN WOUND: HCPCS

## 2021-01-13 NOTE — PROGRESS NOTES
Phone: Cristobal           Fax: [phone]                           Outpatient Physical Therapy                                                                            Daily Note    Patient: Matty Chiang : 1975  CSN #: [id number]   Referring Practitioner:  Deysi Parker MD    Referral Date : 20     Date: 2021    Diagnosis: Other tear of medial meniscus left knee, S83.242A  Treatment Diagnosis: left knee pain, difficulty walking    Onset Date: 20  PT Insurance Information: [facility] - 12 visits, +12 visits  Total # of Visits Approved: 24 Per Physician Order  Total # of Visits to Date: 16  No Show: 0  Canceled Appointment: 0      Pre-Treatment Pain:  2/10  Subjective: Knee continues to feel stiff and will lock up at times. Pt states he was seen by  yesterday who ordered a steroid for the swelling. Will see  again on the  and will see what plan is then; may have cortisone shot or may need further surgery. Walked on "[employer] this morning. Exercises:  Exercise 5: Bike 10 min, hills lv 4.0  Exercise 10: step stretch flex and ext - 20s 5x each  Exercise 12: FSU/SSU  8in x 20,  8\" SD 15x  Exercise 15: SLS 3x 30 sec airex  Exercise 17: single LEG PRESS 3x10 - 5 plates  Exercise 18: keisre retro walks 30# 20x ; side steps 15# 5x each      Modalities:  Cryotherapy (Minutes\Location): 15mins with IFC  Ultrasound: 8 mins to med knee at 0.8 and 10%  E-stim (parameters): IFC for pain and swelling       Assessment  Assessment: Pt with 115 degrees of left knee flexion in supine this date. Incisions well healed. Will continue to progress as able. Activity Tolerance  Activity Tolerance: Patient Tolerated treatment well    Patient Education  Patient Education: improved left knee ROM  Pt verbalized/demonstrated good understanding:     [x] Yes         [] No, pt required further clarification.        Post Treatment Pain:  2/10      Plan  Times per week: 3  Plan weeks: 6      Goals  (Total # of Visits to Date: 12)      Short term goals  Time Frame for Short term goals: 3 weeks  Short term goal 1: Pt to be instructed in home program.--met  Short term goal 2: Pt to be lacking no greater than 5 degrees of left knee ext to assist with proper gait- MET  Short term goal 3: Pt to achieve 90 degrees of left knee flexion to assist with ADL's.--met    Long term goals  Time Frame for Long term goals : 6 weeks  Long term goal 1: Pt to report independence and compliance with home program.  Long term goal 2: Pt to achieve 120 degrees of left knee flexion to assist with work related tasks. Long term goal 3: Pt to be lacking no greater than 1 degree of left knee ext to assist with proper gait. Long term goal 4: Pt to demonstrate 4+/5 strength of left quad to assist with functional tasks. Long term goal 5: Pt to ascend/descend 6 inch steps without use of handrail.     Minutes Tracking:  Time In: 1581  Time Out: [address]  Minutes: 64  Timed Code Treatment Minutes: 57 Minutes       Treatment Charges: Code Total Mins Units Time In/Out   [] Evaluation       []  Low       []  Moderate       []  High  [] Re-Evaluation   68901  41477  25990  73892      [x]  Ther Exercise 57556 90 2 [phone]   []  Manual Therapy 72060        [x]  Unattend Estim 13965 15 1 [phone]   [x]  Ultrasound 09226 5 8 [phone]   []  Iontophoresis 98982      []  Neuro Kevin 83584      []  Aquatics 76887      []  Traction 23978      []  Work Hardening 66378      []  FCE 69126      Total Treatment time  62 min         Art Perkins , PT, DPT, CMPT      Date: 1/13/2021

## 2021-01-14 ENCOUNTER — APPOINTMENT (OUTPATIENT)
Dept: PHYSICAL THERAPY | Age: 46
End: 2021-01-14
Payer: COMMERCIAL

## 2021-01-15 ENCOUNTER — HOSPITAL ENCOUNTER (OUTPATIENT)
Dept: PHYSICAL THERAPY | Age: 46
Setting detail: THERAPIES SERIES
Discharge: HOME | End: 2021-01-15
Payer: COMMERCIAL

## 2021-01-15 PROCEDURE — G0283 ELEC STIM OTHER THAN WOUND: HCPCS

## 2021-01-15 PROCEDURE — 97110 THERAPEUTIC EXERCISES: CPT

## 2021-01-15 NOTE — PROGRESS NOTES
Phone: Cristobal           Fax: [phone]                           Outpatient Physical Therapy                                                                            Daily Note    Patient: Gigi Garves : 1975  CSN #: [id number]   Referring Practitioner:  Trisha Fleischer, MD    Referral Date : 20     Date: 1/15/2021    Diagnosis: Other tear of medial meniscus left knee, S83.242A  Treatment Diagnosis: left knee pain, difficulty walking    Onset Date: 20  PT Insurance Information: [facility] - 12 visits, +12 visits  Total # of Visits Approved: 24 Per Physician Order  Total # of Visits to Date:   No Show: 0  Canceled Appointment: 0      Pre-Treatment Pain:  3/10  Subjective: Pt states knee is stiff again this morning. Was on TM early this morning to try to decrease stiffness. Walks for 40min at normal walking pace. Exercises:  Exercise 5: Bike 10 min, hills lv 4.0  Exercise 7: step lunge 10x hold 10 sec  Exercise 10: step stretch flex and ext - 20s 5x each  Exercise 12: FSU/SSU  8in x 20,  8\" SD 15x  Exercise 13: SL HS curl startrac x10 45#; SL knee ext 35# x10  Exercise 17: single LEG PRESS 3x10 - 5 plates; 4pl and 8B57 this date      Modalities:  Moist heat: 15 min instead of CP this date  Cryotherapy (Minutes\Location): 15mins with IFC - not today  Ultrasound: 8 mins to med knee at 0.8 and 10%  E-stim (parameters): IFC for pain and swelling       Assessment  Assessment: Pt with 120 degrees of left knee flexion with self stretching at step this date. Pt continues with antalgic gait pattern following prolong static positioning. Requires only occasional cueing for proper form during ex. Will continue to progress as pt tolerates.     Activity Tolerance  Activity Tolerance: Patient Tolerated treatment well    Patient Education  Patient Education: Continued HEP for ROM and to decrease stiffness; HP vs CP  Pt verbalized/demonstrated good understanding: [x] Yes         [] No, pt required further clarification. Post Treatment Pain:  2/10      Plan  Times per week: 3  Plan weeks: 6      Goals  (Total # of Visits to Date: 16)      Short term goals  Time Frame for Short term goals: 3 weeks  Short term goal 1: Pt to be instructed in home program.--met  Short term goal 2: Pt to be lacking no greater than 5 degrees of left knee ext to assist with proper gait- MET  Short term goal 3: Pt to achieve 90 degrees of left knee flexion to assist with ADL's.--met    Long term goals  Time Frame for Long term goals : 6 weeks  Long term goal 1: Pt to report independence and compliance with home program.  Long term goal 2: Pt to achieve 120 degrees of left knee flexion to assist with work related tasks. Long term goal 3: Pt to be lacking no greater than 1 degree of left knee ext to assist with proper gait. Long term goal 4: Pt to demonstrate 4+/5 strength of left quad to assist with functional tasks. Long term goal 5: Pt to ascend/descend 6 inch steps without use of handrail.     Minutes Tracking:  Time In: 0703  Time Out: 0815  Minutes: 72  Timed Code Treatment Minutes: 66 Minutes       Treatment Charges: Code Total Mins Units Time In/Out   [] Evaluation       []  Low       []  Moderate       []  High  [] Re-Evaluation   01490  80552 52881 97899      [x]  Ther Exercise 60403 21 3 [phone]   []  Manual Therapy 33226        [x]  Unattend Estim 58990 92 1 [phone]   []  Ultrasound 88819      []  Iontophoresis 56043      []  Neuro Kevin 69952      []  Aquatics 10517      []  Traction 47481      []  Work Hardening 31173      []  FCE 44486      Total Treatment time  58 min         Francesca Junior PT, DPT, CMPT      Date: 1/15/2021

## 2021-01-18 ENCOUNTER — HOSPITAL ENCOUNTER (OUTPATIENT)
Dept: PHYSICAL THERAPY | Age: 46
Setting detail: THERAPIES SERIES
Discharge: HOME | End: 2021-01-18
Payer: COMMERCIAL

## 2021-01-18 PROCEDURE — 97140 MANUAL THERAPY 1/> REGIONS: CPT

## 2021-01-18 PROCEDURE — G0283 ELEC STIM OTHER THAN WOUND: HCPCS

## 2021-01-18 PROCEDURE — 97035 APP MDLTY 1+ULTRASOUND EA 15: CPT

## 2021-01-18 PROCEDURE — 97110 THERAPEUTIC EXERCISES: CPT

## 2021-01-18 NOTE — PROGRESS NOTES
Phone: Cristobal           Fax: [phone]                           Outpatient Physical Therapy                                                                            Daily Note    Patient: Lore Graham : 1975  CSN #: [id number]   Referring Practitioner:  Sumi Reed MD    Referral Date : 20     Date: 2021    Diagnosis: Other tear of medial meniscus left knee, S83.242A  Treatment Diagnosis: left knee pain, difficulty walking    Onset Date: 20  PT Insurance Information: [facility] - 12 visits, +12 visits  Total # of Visits Approved: 24 Per Physician Order  Total # of Visits to Date: 25  No Show: 0  Canceled Appointment: 0      Pre-Treatment Pain:  3/10  Subjective: Stiff and sore today. Trying to do more walking and things around the house to decrease stiffness    Exercises:  Exercise 7: step lunge 10x hold 10 sec  Exercise 10: step stretch flex and ext - 20s 5x each  Exercise 12: FSU/SSU  8in x 20,  8\" SD 15x  Exercise 13: SL HS curl startrac x10 45#; SL knee ext 35# x10  Exercise 14: Sit to stands 2x10 --  10#  Exercise 15: SLS 3x 30 sec airex  Exercise 16: 12 inch aldo - ant and lat 15x  Exercise 17: single LEG PRESS 3x10 - 5 plates; 4pl and 6F67 this date  Exercise 18: keisre retro walks 30# 20x ; side steps 15# 5x each    Manual:  PROM: L knee ROM    Modalities:  Moist heat: 15 min instead of CP this date  Ultrasound: 8 mins to med knee at 0.8 and 10%  E-stim (parameters): IFC for pain and swelling       Assessment  Assessment: Pt with complaints of stiffness in knee. Flexion remains around 120 deg. Manual stretching tolerated well. Open chain strength with LAQ is 4-/4/5. US, IFC/hp for pain and stiffness post. session.     Activity Tolerance  Activity Tolerance: Patient Tolerated treatment well    Patient Education  Patient Education: Use of Heat for home  Pt verbalized/demonstrated good understanding:     [x] Yes         [] No, pt required further clarification. Post Treatment Pain:  3/10      Plan  Times per week: 3  Plan weeks: 6      Goals  (Total # of Visits to Date: 25)      Short term goals  Time Frame for Short term goals: 3 weeks  Short term goal 1: Pt to be instructed in home program.--met  Short term goal 2: Pt to be lacking no greater than 5 degrees of left knee ext to assist with proper gait- MET  Short term goal 3: Pt to achieve 90 degrees of left knee flexion to assist with ADL's.--met    Long term goals  Long term goal 1: Pt to report independence and compliance with home program.  Long term goal 2: Pt to achieve 120 degrees of left knee flexion to assist with work related tasks. Long term goal 3: Pt to be lacking no greater than 1 degree of left knee ext to assist with proper gait. Long term goal 4: Pt to demonstrate 4+/5 strength of left quad to assist with functional tasks. Long term goal 5: Pt to ascend/descend 6 inch steps without use of handrail.     Minutes Tracking:  Time In: 0745  Time Out: [address]  Minutes: 80     Treatment Charges: Code Total Mins Units Time In/Out   [] Evaluation       []  Low       []  Moderate       []  High  [] Re-Evaluation   61921  79501  17724  49704      [x]  Ther Exercise 00886 21 1 [phone]   [x]  Manual Therapy 20272 10  1  [phone]   [x]  Unattend Estim 50332 63 1 [phone]   [x]  Ultrasound 42854 4 9 [phone]   []  Iontophoresis 64543      []  Neuro Kevin 37340      []  Aquatics 96499      []  Traction 88827      []  Work Hardening 27704      []  E 36995      Total Treatment time  68 min 6         Oswaldo Diallo PTA     Date: 1/18/2021

## 2021-01-20 ENCOUNTER — HOSPITAL ENCOUNTER (OUTPATIENT)
Dept: PHYSICAL THERAPY | Age: 46
Setting detail: THERAPIES SERIES
Discharge: HOME | End: 2021-01-20
Payer: COMMERCIAL

## 2021-01-20 PROCEDURE — 97035 APP MDLTY 1+ULTRASOUND EA 15: CPT

## 2021-01-20 PROCEDURE — 97110 THERAPEUTIC EXERCISES: CPT

## 2021-01-20 PROCEDURE — G0283 ELEC STIM OTHER THAN WOUND: HCPCS

## 2021-01-20 NOTE — PROGRESS NOTES
Phone: Cristobal           Fax: [phone]                           Outpatient Physical Therapy                                                                            Daily Note    Patient: Reddy Tejada : 1975  CSN #: [id number]   Referring Practitioner:  Aime Canales MD    Referral Date : 20     Date: 2021    Diagnosis: Other tear of medial meniscus left knee, S83.242A  Treatment Diagnosis: left knee pain, difficulty walking    Onset Date: 20  PT Insurance Information: [facility] - 12 visits, +12 visits  Total # of Visits Approved: 24 Per Physician Order  Total # of Visits to Date: 23  No Show: 0  Canceled Appointment: 0      Pre-Treatment Pain:  2/10  Subjective: Pt reports he is still sore on the ineer knee. Pt reports he notices more pain with increased activity. Exercises:  Exercise 5: Bike 10 min, hills lv 4.0  Exercise 7: step lunge 10x hold 10 sec  Exercise 12: FSU/SSU  8in x 20,  8\" SD 15x  Exercise 13: SL HS curl startrac x10 45#; SL knee ext 35# x10  Exercise 14: Sit to stands 2x10 --  15#  Exercise 15: SLS 3x 30 sec airex  Exercise 16: 12 inch aldo - ant and lat 15x  Exercise 17: single LEG PRESS 3x10 - 5 plates; 4pl and 2X95 this date  Exercise 18: keisre retro walks 30# 20x ; side steps 15# 5x each    Manual:  PROM: L knee ROM    Modalities:  Moist heat: 15 min instead of CP this date  Ultrasound: 8 mins to med knee at 0.8 and 10%  E-stim (parameters): IFC for pain and swelling     Assessment  Assessment: Pain remains on medial knee joint with mild pop noted at times. IFC/ US at end of session for pain control. Will cont to advance as tolerable. Activity Tolerance  Activity Tolerance: Patient Tolerated treatment well    Patient Education  Patient Education: Ex rationale. Pt verbalized/demonstrated good understanding:     [x] Yes         [] No, pt required further clarification.      Post Treatment Pain: 2/10    Plan  Times per week: 3  Plan weeks: 6    Goals  (Total # of Visits to Date: 23)      Short term goals  Time Frame for Short term goals: 3 weeks  Short term goal 1: Pt to be instructed in home program.--met  Short term goal 2: Pt to be lacking no greater than 5 degrees of left knee ext to assist with proper gait- MET  Short term goal 3: Pt to achieve 90 degrees of left knee flexion to assist with ADL's.--met    Long term goals  Time Frame for Long term goals : 6 weeks  Long term goal 1: Pt to report independence and compliance with home program.  Long term goal 2: Pt to achieve 120 degrees of left knee flexion to assist with work related tasks. Long term goal 3: Pt to be lacking no greater than 1 degree of left knee ext to assist with proper gait. Long term goal 4: Pt to demonstrate 4+/5 strength of left quad to assist with functional tasks. Long term goal 5: Pt to ascend/descend 6 inch steps without use of handrail.     Minutes Tracking:  Time In: 8911  Time Out: [address]  Minutes: 71  Timed Code Treatment Minutes: 69 Minutes      Treatment Charges: Code Total Mins Units Time In/Out   [] Evaluation       []  Low       []  Moderate       []  High  [] Re-Evaluation   49925  58678  80703  53499      [x]  Ther Exercise 83093 41 9 [phone]   []  Manual Therapy 70503        [x]  Unattend Estim 93816 86 3 [phone]   [x]  Ultrasound 04274 3 8 [phone]   []  Iontophoresis 85991      []  Neuro Kevin 29977      []  Aquatics 66085      []  Traction 10379      []  Work Hardening 27930      []  FCE 77620      Total Treatment time  71 min         Tonio Arzate PTA       Date: 1/20/2021

## 2021-01-22 ENCOUNTER — HOSPITAL ENCOUNTER (OUTPATIENT)
Dept: PHYSICAL THERAPY | Age: 46
Setting detail: THERAPIES SERIES
Discharge: HOME | End: 2021-01-22
Payer: COMMERCIAL

## 2021-01-22 PROCEDURE — 97110 THERAPEUTIC EXERCISES: CPT

## 2021-01-22 PROCEDURE — G0283 ELEC STIM OTHER THAN WOUND: HCPCS

## 2021-01-22 PROCEDURE — 97035 APP MDLTY 1+ULTRASOUND EA 15: CPT

## 2021-01-22 NOTE — PROGRESS NOTES
Phone: Cristobal           Fax: [phone]                           Outpatient Physical Therapy                                                                            Daily Note    Patient: Juancho Friend : 1975  CSN #: [id number]   Referring Practitioner:  Jessika Cortes MD    Referral Date : 20     Date: 2021    Diagnosis: Other tear of medial meniscus left knee, S83.242A  Treatment Diagnosis: left knee pain, difficulty walking    Onset Date: 20  PT Insurance Information: [facility] - 12 visits, +12 visits  Total # of Visits Approved: 24 Per Physician Order  Total # of Visits to Date: 20  No Show: 0  Canceled Appointment: 0      Pre-Treatment Pain:  2/10  Subjective: Pt states pain today rates about 2/10. Has not been on TM all week hoping it would help with cyst but has not seen a difference. Exercises:  Exercise 5: Bike 10 min, hills lv 4.0  Exercise 7: step lunge 10x hold 10 sec  Exercise 10: step stretch flex and ext - 20s 5x each  Exercise 12: FSU/SSU  8in x 20,  8\" SD 15x  Exercise 14: Sit to stands 2x10 - 15#      Modalities:  Moist heat: 15 min  Ultrasound: 8 mins to med knee at 0.8 and 10%  E-stim (parameters): IFC for pain and swelling       Assessment  Assessment: Pt continues to demonstrate pain behaviors with strengthening ex. Able to ascend 8 inch step with min compensation noted. Will continue to progress as pt tolerates. Activity Tolerance  Activity Tolerance: Patient Tolerated treatment well    Patient Education  Patient Education: continued HEP  Pt verbalized/demonstrated good understanding:     [x] Yes         [] No, pt required further clarification.        Post Treatment Pain:  2/10      Plan  Times per week: 3  Plan weeks: 6      Goals  (Total # of Visits to Date: 21)      Short term goals  Time Frame for Short term goals: 3 weeks  Short term goal 1: Pt to be instructed in home program.--met  Short term goal 2: Pt to be

## 2021-01-25 ENCOUNTER — HOSPITAL ENCOUNTER (OUTPATIENT)
Dept: PHYSICAL THERAPY | Age: 46
Setting detail: THERAPIES SERIES
Discharge: HOME | End: 2021-01-25
Payer: COMMERCIAL

## 2021-01-25 PROCEDURE — 97110 THERAPEUTIC EXERCISES: CPT

## 2021-01-25 PROCEDURE — 97035 APP MDLTY 1+ULTRASOUND EA 15: CPT

## 2021-01-25 PROCEDURE — G0283 ELEC STIM OTHER THAN WOUND: HCPCS

## 2021-01-25 NOTE — PROGRESS NOTES
Phone: Cristobal           Fax: [phone]                           Outpatient Physical Therapy                                                                            Daily Note    Patient: Verner Peck : 1975  CSN #: [id number]   Referring Practitioner:  Natanael Nicole MD    Referral Date : 20     Date: 2021    Diagnosis: Other tear of medial meniscus left knee, S83.242A  Treatment Diagnosis: left knee pain, difficulty walking    Onset Date: 20  PT Insurance Information: [facility] - 12 visits, +12 visits  Total # of Visits Approved: 24 Per Physician Order  Total # of Visits to Date: 24      Pre-Treatment Pain:  4/10  Subjective: Pt reports continued L kne stiffness worse with the weather change. Pt states he finds out when her goes back to work tomorrow at his doctor's appointment    Exercises:  Exercise 5: Bike 10 min, hills lv 4.0  Exercise 7: step lunge 10x hold 10 sec  Exercise 10: step stretch flex and ext - 20s 5x each  Exercise 12: FSU/SSU  8in x 20,  8\" SD 15x  Exercise 14: Sit to stands 2x10 - 15#  Exercise 15: SLS 3x 30 sec airex  Exercise 16: 12 inch aldo - ant and lat 15x  Exercise 17: single LEG PRESS  - 5 plates 0O49 this date      Modalities:  Moist heat: 15 min  Ultrasound: 8 mins to med knee at 0.8 and 10%  E-stim (parameters): IFC for pain and swelling       Assessment  Assessment: Pt presented with L leg weakness when performing single leg leg press and reported pain during activity. Pt required a few rest breaks this date due to pain. Continue with US and estim to reduce pain      Patient Education  Patient Education: reviewed activity level at home  Pt verbalized/demonstrated good understanding:     [x] Yes         [] No, pt required further clarification.        Post Treatment Pain:  3/10      Plan  Times per week: 3  Plan weeks: 6      Goals  (Total # of Visits to Date: 24)      Short term goals  Time Frame for Short term goals: 3 weeks  Short term goal 1: Pt to be instructed in home program.--met  Short term goal 2: Pt to be lacking no greater than 5 degrees of left knee ext to assist with proper gait- MET  Short term goal 3: Pt to achieve 90 degrees of left knee flexion to assist with ADL's.--met    Long term goals  Long term goal 1: Pt to report independence and compliance with home program.  Long term goal 2: Pt to achieve 120 degrees of left knee flexion to assist with work related tasks. Long term goal 3: Pt to be lacking no greater than 1 degree of left knee ext to assist with proper gait. Long term goal 4: Pt to demonstrate 4+/5 strength of left quad to assist with functional tasks. Long term goal 5: Pt to ascend/descend 6 inch steps without use of handrail.     Minutes Tracking:  Time In: 0829  Time Out: [address]  Minutes: 66  Timed Code Treatment Minutes: 60 Minutes      Treatment Charges: Code Total Mins Units Time In/Out   [] Evaluation       []  Low       []  Moderate       []  High  [] Re-Evaluation   85785  21576  58358  94402      [x]  Ther Exercise 36260 78 5 [phone]   []  Manual Therapy 31656        [x]  Unattend Estim 05067 15 1 [phone]   [x]  Ultrasound 64786 9 4 9806-3150   []  Iontophoresis 39258      []  Neuro Kevin 76342      []  Aquatics 78143      []  Traction 32975      []  Work Hardening 48113      []  FCE 30368      Total Treatment time  60 min          Natanael Nicole PT, DPT      Date: 1/25/2021

## 2021-01-25 NOTE — PROGRESS NOTES
Phone: Cristobal           Fax: [phone]                           Outpatient Physical Therapy                                                                            Daily Note    Patient: Grover Lepe : 1975  CSN #: [id number]   Referring Practitioner:  Sharmila Diallo MD    Referral Date : 20     Date: 2021    Diagnosis: Other tear of medial meniscus left knee, S83.242A  Treatment Diagnosis: left knee pain, difficulty walking    Onset Date: 20  PT Insurance Information: [facility] - 12 visits, +12 visits  Total # of Visits Approved: 24 Per Physician Order  Total # of Visits to Date: 20  No Show: 0  Canceled Appointment: 0      Pre-Treatment Pain:  2/10  Subjective: Pt states pain today rates about 2/10. Has not been on TM all week hoping it would help with cyst but has not seen a difference. Exercises:  Exercise 5: Bike 10 min, hills lv 4.0  Exercise 7: step lunge 10x hold 10 sec  Exercise 10: step stretch flex and ext - 20s 5x each  Exercise 12: FSU/SSU  8in x 20,  8\" SD 15x  Exercise 14: Sit to stands 2x10 - 15#      Modalities:  Moist heat: 15 min  Ultrasound: 8 mins to med knee at 0.8 and 10%  E-stim (parameters): IFC for pain and swelling       Assessment  Assessment: Pt continues to demonstrate pain behaviors with strengthening ex. Able to ascend 8 inch step with min compensation noted. Will continue to progress as pt tolerates. Activity Tolerance  Activity Tolerance: Patient Tolerated treatment well    Patient Education  Patient Education: continued HEP  Pt verbalized/demonstrated good understanding:     [x] Yes         [] No, pt required further clarification.        Post Treatment Pain:  2/10      Plan  Times per week: 3  Plan weeks: 6      Goals  (Total # of Visits to Date: 21)      Short term goals  Time Frame for Short term goals: 3 weeks  Short term goal 1: Pt to be instructed in home program.--met Short term goal 2: Pt to be lacking no greater than 5 degrees of left knee ext to assist with proper gait- MET  Short term goal 3: Pt to achieve 90 degrees of left knee flexion to assist with ADL's.--met    Long term goals  Time Frame for Long term goals : 6 weeks  Long term goal 1: Pt to report independence and compliance with home program.  Long term goal 2: Pt to achieve 120 degrees of left knee flexion to assist with work related tasks. Long term goal 3: Pt to be lacking no greater than 1 degree of left knee ext to assist with proper gait. Long term goal 4: Pt to demonstrate 4+/5 strength of left quad to assist with functional tasks. Long term goal 5: Pt to ascend/descend 6 inch steps without use of handrail.     Minutes Tracking:  Time In: 0703  Time Out: 0805  Minutes: 62  Timed Code Treatment Minutes: Milton Rahman PT, DPT, CMPT      Date: 1/22/2021

## 2021-01-27 ENCOUNTER — HOSPITAL ENCOUNTER (OUTPATIENT)
Dept: PHYSICAL THERAPY | Age: 46
Setting detail: THERAPIES SERIES
Discharge: HOME | End: 2021-01-27
Payer: COMMERCIAL

## 2021-01-27 PROCEDURE — 97035 APP MDLTY 1+ULTRASOUND EA 15: CPT

## 2021-01-27 PROCEDURE — 97110 THERAPEUTIC EXERCISES: CPT

## 2021-01-27 PROCEDURE — G0283 ELEC STIM OTHER THAN WOUND: HCPCS

## 2021-01-27 NOTE — PROGRESS NOTES
Phone: Cristobal           Fax: [phone]                           Outpatient Physical Therapy                                                                            Daily Note    Patient: Sneha Curiel : 1975  CSN #: [id number]   Referring Practitioner:  Laura Daniel MD    Referral Date : 20     Date: 2021    Diagnosis: Other tear of medial meniscus left knee, S83.242A  Treatment Diagnosis: left knee pain, difficulty walking    Onset Date: 20  PT Insurance Information: [facility] - 12 visits, +12 visits  Total # of Visits Approved: 24 Per Physician Order  Total # of Visits to Date: 22  No Show: 0  Canceled Appointment: 0      Pre-Treatment Pain:  2/10  Subjective: States pain ios 2/10. States knee is more achey. States he seen  yesterday and he is ordering a scan. States he may have to get cyst removed    Exercises:  Exercise 5: Bike 10 min, hills lv 5.0  Exercise 10: step stretch flex and ext - 20s 5x each  Exercise 12: FSU/SSU  9in x 20,  9\" SD 15x  Exercise 13: SL HS curl startrac x10 45#; SL knee ext 35# x10  Exercise 14: Sit to stands 2x10 - 15#  Exercise 15: SLS 3x 30 sec airex  Exercise 16: 12 inch aldo - ant and lat 15x  Exercise 17: single LEG PRESS  - 7 plates 4B58 this date     Modalities:  Cryotherapy (Minutes\Location): 15mins with IFC -  Ultrasound: 8 mins to med knee at 0.8 and 10%       Assessment  Assessment: Progressed leg press wts and increased step up height to 9 inches with good tolerance. Strength in hip and quads 4+/5. Modalities following session    Activity Tolerance  Activity Tolerance: Patient Tolerated treatment well    Patient Education  Patient Education: HEP  Pt verbalized/demonstrated good understanding:     [x] Yes         [] No, pt required further clarification.        Post Treatment Pain:  2/10      Plan  Times per week: 3         Goals  (Total # of Visits to Date: 25)      Short term goals  Time Frame for Short term goals: 3 weeks  Short term goal 1: Pt to be instructed in home program.--met  Short term goal 2: Pt to be lacking no greater than 5 degrees of left knee ext to assist with proper gait- MET  Short term goal 3: Pt to achieve 90 degrees of left knee flexion to assist with ADL's.--met    Long term goals  Time Frame for Long term goals : 6 weeks  Long term goal 1: Pt to report independence and compliance with home program.  Long term goal 2: Pt to achieve 120 degrees of left knee flexion to assist with work related tasks. Long term goal 4: Pt to demonstrate 4+/5 strength of left quad to assist with functional tasks. Long term goal 5: Pt to ascend/descend 6 inch steps without use of handrail.     Minutes Tracking:  Time In: 0700  Time Out: 0805  Minutes: 65  Timed Code Treatment Minutes: 64 Minutes  Treatment Charges: Code Total Mins Units Time In/Out   [] Evaluation       []  Low       []  Moderate       []  High  [] Re-Evaluation   66384  20528  48606  59365      [x]  Ther Exercise 20384 96 0 [phone]   []  Manual Therapy 77244        [x]  Unattend Estim 90324 53 [phone]   [x]  Ultrasound 22209 5 5 [phone]   []  Iontophoresis 80093      []  Neuro Kevin 68202      []  Aquatics 66644      []  Traction 86173      []  Work Hardening 90248      []  FCE 72953      Total Treatment time  63 min 5            Oswaldo Diallo PTA     Date: 1/27/2021

## 2021-01-29 ENCOUNTER — HOSPITAL ENCOUNTER (OUTPATIENT)
Dept: PHYSICAL THERAPY | Age: 46
Setting detail: THERAPIES SERIES
Discharge: HOME | End: 2021-01-29
Payer: COMMERCIAL

## 2021-01-29 PROCEDURE — 97110 THERAPEUTIC EXERCISES: CPT

## 2021-01-29 PROCEDURE — 97035 APP MDLTY 1+ULTRASOUND EA 15: CPT

## 2021-01-29 PROCEDURE — G0283 ELEC STIM OTHER THAN WOUND: HCPCS

## 2021-01-29 NOTE — PROGRESS NOTES
Phone: Cristobal           Fax: [phone]                           Outpatient Physical Therapy                                                                            Daily Note    Patient: Lore Graham : 1975  CSN #: [id number]   Referring Practitioner:  Sumi Reed MD    Referral Date : 20     Date: 2021    Diagnosis: Other tear of medial meniscus left knee, S83.242A  Treatment Diagnosis: left knee pain, difficulty walking    Onset Date: 20  PT Insurance Information: [facility] - 12 visits, +12 visits  Total # of Visits Approved: 24 Per Physician Order  Total # of Visits to Date: 23  No Show: 0  Canceled Appointment: 0      Pre-Treatment Pain:  2/10  Subjective: Pt states he may be going back to work with restrictions but not sure yet. Knee continues to feel stiff and sore. Exercises:  Exercise 5: Bike 10 min, hills lv 5.0  Exercise 12: FSU/SSU  9in x 20,  9\" SD 15x  Exercise 14: Sit to stands 2x10 - 15#  Exercise 15: SLS 3x 30 sec airex  Exercise 17: single LEG PRESS  - 7 plates 6S21 this date  Exercise 18: keisre retro walks 30# 20x ; side steps 15# 5x each      Modalities:  Moist heat: 15 min  Ultrasound: 8 mins to med knee at 0.8 and 10%  E-stim (parameters): IFC for pain and swelling       Assessment   Pt is progressing well. Strength improved. Has one visit remaining. Activity Tolerance  Activity Tolerance: Patient Tolerated treatment well    Patient Education  Patient Education: one visit remaining per [address]  Pt verbalized/demonstrated good understanding:     [x] Yes         [] No, pt required further clarification.        Post Treatment Pain:  2/10      Plan  Times per week: 3  Plan weeks: 6      Goals  (Total # of Visits to Date: 21)      Short term goals  Time Frame for Short term goals: 3 weeks  Short term goal 1: Pt to be instructed in home program.--met  Short term goal 2: Pt to be lacking no greater than 5 degrees of left knee ext to assist with proper gait- MET  Short term goal 3: Pt to achieve 90 degrees of left knee flexion to assist with ADL's.--met    Long term goals  Time Frame for Long term goals : 6 weeks  Long term goal 1: Pt to report independence and compliance with home program.  Long term goal 2: Pt to achieve 120 degrees of left knee flexion to assist with work related tasks. Long term goal 3: Pt to be lacking no greater than 1 degree of left knee ext to assist with proper gait. Long term goal 4: Pt to demonstrate 4+/5 strength of left quad to assist with functional tasks. Long term goal 5: Pt to ascend/descend 6 inch steps without use of handrail.     Minutes Tracking:  Time In: 7531  Time Out: Nata Bryant 82  Minutes: 63  Timed Code Treatment Minutes: 58 Minutes      Treatment Charges: Code Total Mins Units Time In/Out   [] Evaluation       []  Low       []  Moderate       []  High  [] Re-Evaluation   54311  53890  28792  06294      [x]  Ther Exercise 25883 25 2 [phone]   []  Manual Therapy 99766        [x]  Unattend Estim 19969 15 1 [phone]   [x]  Ultrasound 46206 8 9 [phone]   []  Iontophoresis 48263      []  Neuro Kevin 88539      []  Aquatics 13018      []  Traction 61769      []  Work Hardening 28583      []  FCE 47890      Total Treatment time  58 min         Anthony Kinsey , PT, DPT, CMPT      Date: 1/29/2021

## 2021-02-01 ENCOUNTER — HOSPITAL ENCOUNTER (OUTPATIENT)
Dept: PHYSICAL THERAPY | Age: 46
Setting detail: THERAPIES SERIES
Discharge: HOME | End: 2021-02-01
Payer: COMMERCIAL

## 2021-02-01 PROCEDURE — G0283 ELEC STIM OTHER THAN WOUND: HCPCS

## 2021-02-01 PROCEDURE — 97035 APP MDLTY 1+ULTRASOUND EA 15: CPT

## 2021-02-01 PROCEDURE — 97110 THERAPEUTIC EXERCISES: CPT

## 2021-02-01 NOTE — PROGRESS NOTES
Phone: Cristobal           Fax: [phone]                           Outpatient Physical Therapy                                                                            Daily Note    Patient: Ramila Pat : 1975  CSN #: [id number]   Referring Practitioner:  Yousuf Anderson MD    Referral Date : 20     Date: 2021    Diagnosis: Other tear of medial meniscus left knee, S83.242A  Treatment Diagnosis: left knee pain, difficulty walking    Onset Date: 20  PT Insurance Information: [facility] - 12 visits, +12 visits  Total # of Visits Approved: 24 Per Physician Order  Total # of Visits to Date: 24  No Show: 0  Canceled Appointment: 0      Pre-Treatment Pain:  2/10  Subjective: Pt states pain level remains at 2/10. Pt states he is unsure what the plan is for work yet, stating that he has not yet heard from his doctor or work. Exercises:  Exercise 5: Bike 10 min, hills lv 5.0  Exercise 12: FSU/SSU  9in x 20,  9\" SD 15x  Exercise 14: Sit to stands 2x10 - 15#  Exercise 18: keisre retro walks 30# 20x ; side steps 15# 5x each      Modalities:  Moist heat: 15 min  Ultrasound: 8 mins to med knee at 0.8 and 10%  E-stim (parameters): IFC for pain and swelling       Assessment  Assessment: Pt has completed 24 PT visits to date. Pt currently demonstrates L hip and quad strength 4+/5. L knee AROM measuring 0-121* this date. Pt able to ascend stairs with recip pattern without HR, although pt is slow and apprehensive d/t pain. When descending stairs, pt intermittently requires step-to pattern d/t pain. Pt has been independent and compliant with HEP. Pt will now be D/C from PT d/t insurance visit restrictions. Pt has currently met 4/5 long term goals. Activity Tolerance  Activity Tolerance: Patient Tolerated treatment well    Patient Education  *Patient Education: Cont HEP and plan for D/C.   Pt verbalized/demonstrated good understanding:     [x] Yes         [] No, pt

## 2021-02-01 NOTE — DISCHARGE SUMMARY
Phone: Cristobal          Fax: [phone]                            Outpatient Physical Therapy                                                                    Discharge Summary    Patient: Monica Cid  : 1975  CSN #: [id number]   Referring physician: No admitting provider for patient encounter. Referring Practitioner: Yennifer Schmidt MD      Diagnosis: Other tear of medial meniscus left knee, S83.242A      Date Treatment Initiated: 2020  Date of Last Treatment: 2021      PT Visit Information  Onset Date: 20  PT Insurance Information: [facility] - 12 visits, +12 visits  Total # of Visits Approved: 24  Total # of Visits to Date: 24  Plan of Care/Certification Expiration Date: 21  No Show: 0  Canceled Appointment: 0      Frequency/Duration  3 times per week  6 weeks      Treatment Received  [x] HP/CP      [x] Electrical Stim   [x] Therapeutic Exercise      [x] Gait Training  [] Aquatics   [x] Ultrasound         [x] Patient Education/HEP   [x] Manual Therapy  [] Traction    [] Neuro-homar        [x] Soft Tissue Mobs            [] Home TENS  [] Iontophoresis    [] Orthotic casting/fitting      [] Dry Needling    Assessment  Assessment: Pt has completed 24 PT visits to date. Pt currently demonstrates L hip and quad strength 4+/5. L knee AROM measuring 0-121* this date. Pt able to ascend stairs with recip pattern without HR, although pt is slow and apprehensive d/t pain. When descending stairs, pt intermittently requires step-to pattern d/t pain. Pt has been independent and compliant with HEP. Pt will now be D/C from PT d/t insurance visit restrictions. Pt has currently met 4/5 long term goals.        Goals  Short term goals  Time Frame for Short term goals: 3 weeks  Short term goal 1: Pt to be instructed in home program.--met  Short term goal 2: Pt to be lacking no greater than 5 degrees of left knee ext to assist with proper gait- MET  Short term goal 3: Pt to achieve 90 degrees of left knee flexion to assist with ADL's.--met    Long term goals  Time Frame for Long term goals : 6 weeks  Long term goal 1: Pt to report independence and compliance with home program.- met  Long term goal 2: Pt to achieve 120 degrees of left knee flexion to assist with work related tasks. - met  Long term goal 3: Pt to be lacking no greater than 1 degree of left knee ext to assist with proper gait. - met  Long term goal 4: Pt to demonstrate 4+/5 strength of left quad to assist with functional tasks.  - met  Long term goal 5: Pt to ascend/descend 6 inch steps without use of handrail. - partly met      Reason for Discharge  [] Goals Achieved                        []  Poor Follow Through/Attendance                  [x]  Optimal Function Achieved-- max # visits approved by insurance      []  Patient Discharged Self    []  Hospitalization                         []  Physician discharge      Thank you for this referral      Garui Pop PT, DPT               Date: 2/1/2021

## 2021-03-09 ENCOUNTER — HOSPITAL ENCOUNTER (OUTPATIENT)
Dept: PHYSICAL THERAPY | Age: 46
Setting detail: THERAPIES SERIES
Discharge: HOME | End: 2021-03-09
Payer: COMMERCIAL

## 2021-03-09 PROCEDURE — W0710 HC WORK COND PROG PER 15 MIN: HCPCS

## 2021-03-09 NOTE — PLAN OF CARE
[facility]           Phone: [phone]             Outpatient Physical Therapy  Fax: [phone]                                           Date: 3/9/2021  Patient: Troy Monroe : 1975 CSN #: [id number]   Referring Practitioner:  Michelle Palmer MD Referral Date:  21       [x] Plan of Care   [] Updated Plan of Care    Dates of Service to Include: 3/9/2021 to 21    Diagnosis:  Cystic meniscus, other medial meniscus of L knee, M23.032, Other tear of medial meniscus of L knee as current injury, H48.317L    Rehab (Treatment) Diagnosis:  L knee pain             Onset Date:  20    Attendance  Total # of Visits to Date: 1 No Show: 0 Canceled Appointment: 0    Assessment  Body structures, Functions, Activity limitations: Decreased functional mobility , Increased pain, Decreased ADL status, Decreased balance, Decreased ROM, Decreased strength, Decreased endurance  Assessment: The patient is a 54 y.o. male who is familar to this clinic and was seen following his knee arthroscope on 2020. He returns for work conditioning as he prepares to return to work as a . He demonstrates decreased L knee ROM: 2*-115*, decreased L LE strength, and impaired balance. He would benefit from work conditioning to be able to return to work activities.       Goals  Short term goals  Time Frame for Short term goals: 2 weeks  Short term goal 1: Patient will tolerate 1 hour of work conditioning  Short term goal 2: Patient will improve L knee flexion to >/=120*  Short term goal 3: .  Long term goals  Time Frame for Long term goals : 4 weeks  Long term goal 1: Patient will tolerate 3 hours of work conditioning to improve endurance for work activities  Long term goal 2: Patient will improve L knee strength to > 4/5 in all major joints and planes  Long term goal 3: Patient will be able to lift and carry 50# without gait deviations  Long term goal 4: Patient will be able to tolerate 5 minutes on the Versaclimber to simulate ladder climbing     Prognosis  Prognosis: Good    Treatment Plan   Times per week: 3  Plan weeks: 4  [x] HP/CP      [] Electrical Stim   [x] Therapeutic Exercise      [x] Gait Training  [] Aquatics   [] Ultrasound         [x] Patient Education/HEP   [x] Manual Therapy  [] Traction    [x] Neuro-homar        [] Soft Tissue Mobs            [] Home TENS  [] Iontophoresis    [] Orthotic casting/fitting      [] Dry Needling             Electronically signed by: Nevin Crawley PT, DPT    Date: 3/9/2021      ______________________________________ Date: 3/9/2021   Physician Signature

## 2021-03-09 NOTE — PROGRESS NOTES
Phone: 0[facility]          Fax: [phone]                      Outpatient Physical Therapy                                                                      Evaluation  Date: 3/9/2021  Patient: Tyler Polanco  : 1975  CSN #: [id number]  Referring Practitioner: Katie Mauro MD    Referral Date : 21     Medical Diagnosis: Cystic meniscus, other medial meniscus of L knee, M23.032, Other tear of medial meniscus of L knee as current injury, N89.184T    Treatment Diagnosis: L knee pain  Onset Date: 20  PT Insurance Information: BWC: work conditioning x12 visits  Total # of Visits Approved: 12   Total # of Visits to Date: 1  No Show: 0  Canceled Appointment: 0     Subjective  Subjective: Patient overall the knee has been the same since he was in PT. He reports the knee locks up and pops. He reports wearing his brace for peace of mind. He reports some days are better than others. He reports pain ranges from 2/10 at best to 6/10 at worst.  Aggravating factors include: standing, walking, being on his feet for long periods of time. Comments: He reports he hasn't worked in a year. He reports working in [employer] where he has to be able to kneel, up and down steps, up and down ladders, he reports having to lift and carry up to 150# by himself. He reports having to wear a 40# backpack up and down a ladder. He reports he was working 7 days a week working 70-80 hours. 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 Additional Pertinent Hx: HTN, depression, OA, smoker, gout    Objective     Observation/Palpation  Palpation: . Observation: Patient ambulates into the clinic without AD, with L knee brace.     RLE General AROM: R knee AROM: 2*-118* in supine  LLE General AROM: L knee AROM: 2*-115*    Strength RLE  Strength RLE: Exception  R Hip Extension: 4+/5  R Hip ABduction: 4+/5  Strength LLE  Strength LLE: Exception  L Hip Flexion: 4/5  L Hip Extension: 4/5  L Hip ADduction: 4/5  L Knee Flexion: 4+/5  L Knee Extension: 4+/5    Assessment  Body structures, Functions, Activity limitations: Decreased functional mobility , Increased pain, Decreased ADL status, Decreased balance, Decreased ROM, Decreased strength, Decreased endurance  Assessment: The patient is a 54 y.o. male who is familar to this clinic and was seen following his knee arthroscope on 11/2/2020. He returns for work conditioning as he prepares to return to work as a . He demonstrates decreased L knee ROM: 2*-115*, decreased L LE strength, and impaired balance. He would benefit from work conditioning to be able to return to work activities. Prognosis: Good  Specific instructions for Next Treatment: Work conditioning     Decision Making: Medium Complexity    Patient Education  Patient Education: POC  Pt verbalized/demonstrated good understanding:     [X] Yes         [] No, pt required further clarification.       Goals  Short term goals  Time Frame for Short term goals: 2 weeks  Short term goal 1: Patient will tolerate 1 hour of work conditioning  Short term goal 2: Patient will improve L knee flexion to >/=120*  Short term goal 3: .    Long term goals  Time Frame for Long term goals : 4 weeks  Long term goal 1: Patient will tolerate 3 hours of work conditioning to improve endurance for work activities  Long term goal 2: Patient will improve L knee strength to > 4/5 in all major joints and planes  Long term goal 3: Patient will be able to lift and carry 50# without gait deviations  Long term goal 4: Patient will be able to tolerate 5 minutes on the [employer] to simulate ladder climbing      Patient goals : Be able to return to work        Minutes Tracking:  Time In: 0847  Time Out: [id number]  Minutes: 56  Timed Code Treatment Minutes: 54 Minutes  Treatment Charges: Code Total Mins Units Time In/Out   [] Evaluation       []  Low       [] Moderate       []  High  [] Re-Evaluation   [id number]  [id number]  [id number]      []  Ther Exercise 87525      []  Manual Therapy 08089        []  Unattend Estim 33893      []  Ultrasound 10220      []  Iontophoresis 00685      []  Neuro Kevin 38951      []  Aquatics 11852      []  Traction 26041      [x]  Work Hardening 43333 37  0 2907-3174   []  FCE 56056      Total Treatment time  54 min          Fredrick Connolly PT, DPT    3/9/2021

## 2021-03-11 ENCOUNTER — HOSPITAL ENCOUNTER (OUTPATIENT)
Dept: PHYSICAL THERAPY | Age: 46
Setting detail: THERAPIES SERIES
Discharge: HOME | End: 2021-03-11
Payer: COMMERCIAL

## 2021-03-11 PROCEDURE — W0710 HC WORK COND PROG PER 15 MIN: HCPCS

## 2021-03-11 NOTE — PROGRESS NOTES
Phone: [facility]           Fax: [phone]                           Outpatient Physical Therapy                                                                            Daily Note    Patient: Tyler Polanco : 1975  CSN #: [id number]   Referring Practitioner:  Katie Mauro MD    Referral Date : 21     Date: 3/11/2021    Diagnosis: Cystic meniscus, other medial meniscus of L knee, M23.032, Other tear of medial meniscus of L knee as current injury, K83.926G  Treatment Diagnosis: L knee pain    Onset Date: 20  PT Insurance Information: BWC: work conditioning x12 visits  Total # of Visits Approved: 12 Per Physician Order  Total # of Visits to Date: 2  No Show: 0  Canceled Appointment: 0      Pre-Treatment Pain:  2/10  Subjective: Patient reports pain is always about the same overall. He reports 2/10 knee pain coming into therapy. Exercises:  Exercise 2: SciFIT: level 3 x 11 minutes  Exercise 3: Sit to stands from chair 2x10  Exercise 4: BTB rows/LAE 2x10 ea  Exercise 5: FSU/SSU/step down from 6\" step x10 ea bilaterally  Exercise 8: UBE standinRPM 5 min forward/5 min. retro  Exercise 9: Joystick forward/back, CW/CCW x20 ea  Exercise 10: Box carry 25# x4 laps  Exercise 11: Airdyne bike x10 minutes  Exercise 20: * exercise sheet will also be scanned into chart *    Assessment  Body structures, Functions, Activity limitations: Decreased functional mobility , Increased pain, Decreased ADL status, Decreased balance, Decreased ROM, Decreased strength, Decreased endurance  Assessment: Patient progressed with exercises to simulate work activities. He reported some \"popping\" with exercises. Activity Tolerance  Pt tolerated tx well    Patient Education  Patient Education: ice PRN  Pt verbalized/demonstrated good understanding:     [x] Yes         [] No, pt required further clarification.     Post Treatment Pain:  2/10    Plan  Times per week: 3  Plan

## 2021-03-16 ENCOUNTER — HOSPITAL ENCOUNTER (OUTPATIENT)
Dept: PHYSICAL THERAPY | Age: 46
Setting detail: THERAPIES SERIES
Discharge: HOME | End: 2021-03-16
Payer: COMMERCIAL

## 2021-03-16 PROCEDURE — W0710 HC WORK COND PROG PER 15 MIN: HCPCS

## 2021-03-16 NOTE — PROGRESS NOTES
Phone: [facility]           Fax: [phone]                           Outpatient Physical Therapy                                                                            Daily Note    Patient: Sukhdev Jones : 1975  CSN #: [id number]   Referring Practitioner:  Dom House MD    Referral Date : 21     Date: 3/16/2021    Diagnosis: Cystic meniscus, other medial meniscus of L knee, M23.032, Other tear of medial meniscus of L knee as current injury, X94.717Q  Treatment Diagnosis: L knee pain    Onset Date: 20  PT Insurance Information: BWC: work conditioning x12 visits  Total # of Visits Approved: 12 Per Physician Order  Total # of Visits to Date: 3  No Show: 0  Canceled Appointment: 0      Pre-Treatment Pain:  2/10  Subjective: Patient reports his knee \"feels achey\" coming into therapy today, which he contributes to the weather. He reports he had minor soreness the day following his last visit. Exercises:  Exercise 20: *See exercise sheet that will be scanned into chart at a later date *    Assessment  Body structures, Functions, Activity limitations: Decreased functional mobility , Increased pain, Decreased ADL status, Decreased balance, Decreased ROM, Decreased strength, Decreased endurance  Assessment: Patient progressed with leg press to continue with LE strengthening activities and was progressed to aldo step over to improve dynamic balance. Activity Tolerance  Activity Tolerance: Patient Tolerated treatment well    Patient Education  Patient Education: New exercise rationale  Pt verbalized/demonstrated good understanding:     [x] Yes         [] No, pt required further clarification.     Post Treatment Pain:  2/10      Plan  Times per week: 3  Plan weeks: 4      Goals  (Total # of Visits to Date: 3)      Short term goals  Time Frame for Short term goals: 2 weeks  Short term goal 1: Patient will tolerate 1 hour of work conditioning -MET  Short term goal 2: Patient will improve L knee flexion to >/=120*    Long term goals  Time Frame for Long term goals : 4 weeks  Long term goal 1: Patient will tolerate 3 hours of work conditioning to improve endurance for work activities  Long term goal 2: Patient will improve L knee strength to > 4/5 in all major joints and planes  Long term goal 3: Patient will be able to lift and carry 50# without gait deviations  Long term goal 4: Patient will be able to tolerate 5 minutes on the [employer]ber to simulate ladder climbing  Long term goal 5: Pt to ascend/descend 6 inch steps without use of handrail. - partly met    Minutes Tracking:  Time In: 0810  Time Out: [facility]  Minutes: 85  Timed Code Treatment Minutes: 83 Minutes    Treatment Charges: Code Total Mins Units Time In/Out   [] Evaluation       []  Low       []  Moderate       []  High  [] Re-Evaluation   87072 84708 87510 87521      []  Ther Exercise 07034      []  Manual Therapy 53994        []  Unattend Estim 94631      []  Ultrasound 12393      []  Iontophoresis 25136      []  Neuro Kevin 52232      []  Aquatics 22207      []  Traction 75093      [x]  Work Hardening 14240 26 0 [id number]   []   792 243      Total Treatment time  83 min          Patrice Erazo  PT, DPT     Date: 3/16/2021

## 2021-03-18 ENCOUNTER — HOSPITAL ENCOUNTER (OUTPATIENT)
Dept: PHYSICAL THERAPY | Age: 46
Setting detail: THERAPIES SERIES
Discharge: HOME | End: 2021-03-18
Payer: COMMERCIAL

## 2021-03-18 PROCEDURE — W0710 HC WORK COND PROG PER 15 MIN: HCPCS

## 2021-03-18 NOTE — PROGRESS NOTES
Phone: [facility]           Fax: [phone]                           Outpatient Physical Therapy                                                                            Daily Note    Patient: Kacy Brown : 1975  CSN #: [id number]   Referring Practitioner:  Hailey Hernandez MD    Referral Date : 21     Date: 3/18/2021    Diagnosis: Cystic meniscus, other medial meniscus of L knee, M23.032, Other tear of medial meniscus of L knee as current injury, V82.120N  Treatment Diagnosis: L knee pain    Onset Date: 20  PT Insurance Information: BWC: work conditioning x12 visits  Total # of Visits Approved: 12 Per Physician Order  Total # of Visits to Date: 4  No Show: 0  Canceled Appointment: 0      Pre-Treatment Pain:  1-2/10  Subjective: Patient reports knee soreness coming into therapy which he contributes to the rainy weather. He rates pain at 1-2/10 today. Exercises:  Exercise 20: *See exercise sheet that will be scanned into chart at a later date *    Assessment  Body structures, Functions, Activity limitations: Decreased functional mobility , Increased pain, Decreased ADL status, Decreased balance, Decreased ROM, Decreased strength, Decreased endurance  Assessment: Maintained current exercise program due to increased soreness today and having two consecutive days of work conditioning. Activity Tolerance  Activity Tolerance: Patient Tolerated treatment well    Patient Education  Patient Education: Ice PRN  Pt verbalized/demonstrated good understanding:     [x] Yes         [] No, pt required further clarification.     Post Treatment Pain:  1-2/10      Plan  Times per week: 3  Plan weeks: 4      Goals  (Total # of Visits to Date: 4)      Short term goals  Time Frame for Short term goals: 2 weeks  Short term goal 1: Patient will tolerate 1 hour of work conditioning -MET  Short term goal 2: Patient will improve L knee flexion to >/=120*    Long term goals  Time Frame for Long term goals : 4 weeks  Long term goal 1: Patient will tolerate 3 hours of work conditioning to improve endurance for work activities  Long term goal 2: Patient will improve L knee strength to > 4/5 in all major joints and planes  Long term goal 3: Patient will be able to lift and carry 50# without gait deviations  Long term goal 4: Patient will be able to tolerate 5 minutes on the [employer] to simulate ladder climbing  Long term goal 5: Pt to ascend/descend 6 inch steps without use of handrail. - partly met    Minutes Tracking:  Time In: 6308  Time Out: [address]  Minutes: 70  Timed Code Treatment Minutes: 60 Minutes    Treatment Charges: Code Total Mins Units Time In/Out   [] Evaluation       []  Low       []  Moderate       []  High  [] Re-Evaluation   54268  80702  67621  50497      []  Ther Exercise 87955      []  Manual Therapy 81114        []  Unattend Estim 33819      []  Ultrasound 05206      []  Iontophoresis 95458      []  Neuro Kevin 85397      []  Aquatics 25611      []  Traction 68358      [x]  Work Hardening 0384 [id number]   []  FCE 59728      Total Treatment time  60 min          Jayme Argueta PT, DPT     Date: 3/18/2021

## 2021-03-19 ENCOUNTER — HOSPITAL ENCOUNTER (OUTPATIENT)
Dept: PHYSICAL THERAPY | Age: 46
Setting detail: THERAPIES SERIES
Discharge: HOME | End: 2021-03-19
Payer: COMMERCIAL

## 2021-03-19 PROCEDURE — W0710 HC WORK COND PROG PER 15 MIN: HCPCS

## 2021-03-19 NOTE — PROGRESS NOTES
Phone: [facility]           Fax: [phone]                           Outpatient Physical Therapy                                                                            Daily Note    Patient: Gigi Graves : 1975  CSN #: [id number]   Referring Practitioner:  Trisha Fleischer, MD    Referral Date : 21     Date: 3/19/2021    Diagnosis: Cystic meniscus, other medial meniscus of L knee, M23.032, Other tear of medial meniscus of L knee as current injury, A26.201B  Treatment Diagnosis: L knee pain    Onset Date: 20  PT Insurance Information: BWC: work conditioning x12 visits  Total # of Visits Approved: 12 Per Physician Order  Total # of Visits to Date: 5  No Show: 0  Canceled Appointment: 0      Pre-Treatment Pain:  2/10  Subjective: Patient reports increased soreness around his patella and joint line today. He reports 2/10 soreness coming into therapy today. Exercises:  Exercise 20: *See exercise sheet that will be scanned into chart at a later date *    Assessment  Body structures, Functions, Activity limitations: Decreased functional mobility , Increased pain, Decreased ADL status, Decreased balance, Decreased ROM, Decreased strength, Decreased endurance  Assessment: Patient progressed with repetitions of exercises to work toward his strength and endurance goals. He was progressed to the [employer] to simulate ladder climbing. He was able to perform 12 steps on the ladder x2 sets before reporting increased soreness. Patient was educated to ice PRN. Activity Tolerance  Activity Tolerance: Patient limited by pain    Patient Education  Patient Education: Ice PRN  Pt verbalized/demonstrated good understanding:     [x] Yes         [] No, pt required further clarification.     Post Treatment Pain:  2/10      Plan  Times per week: 3  Plan weeks: 4      Goals  (Total # of Visits to Date: 5)      Short term goals  Time Frame for Short term goals: 2 weeks  Short term goal 1: Patient will tolerate 1 hour of work conditioning -MET  Short term goal 2: Patient will improve L knee flexion to >/=120*    Long term goals  Time Frame for Long term goals : 4 weeks  Long term goal 1: Patient will tolerate 3 hours of work conditioning to improve endurance for work activities  Long term goal 2: Patient will improve L knee strength to > 4/5 in all major joints and planes  Long term goal 3: Patient will be able to lift and carry 50# without gait deviations  Long term goal 4: Patient will be able to tolerate 5 minutes on the [employer] to simulate ladder climbing  Long term goal 5: Pt to ascend/descend 6 inch steps without use of handrail. - partly met    Minutes Tracking:  Time In: 0820  Time Out: [address]  Minutes: 80  Timed Code Treatment Minutes: 78 Minutes  Treatment Charges: Code Total Mins Units Time In/Out   [] Evaluation       []  Low       []  Moderate       []  High  [] Re-Evaluation   43896  04780  68033  87641      []  Ther Exercise 89013      []  Manual Therapy 51331        []  Unattend Estim 48486      []  Ultrasound 34491      []  Iontophoresis 94065      []  Neuro Kevin 39958      []  Aquatics 66414      []  Traction 15858      [x]  Work Hardening 85514 92 9 [id number]   []   232 243      Total Treatment time  78 min          Nevin Crawley PT, DPT     Date: 3/19/2021

## 2021-03-23 ENCOUNTER — HOSPITAL ENCOUNTER (OUTPATIENT)
Dept: PHYSICAL THERAPY | Age: 46
Setting detail: THERAPIES SERIES
Discharge: HOME | End: 2021-03-23
Payer: COMMERCIAL

## 2021-03-23 PROCEDURE — W0710 HC WORK COND PROG PER 15 MIN: HCPCS

## 2021-03-23 NOTE — PROGRESS NOTES
Phone: [facility]           Fax: [phone]                           Outpatient Physical Therapy                                                                            Daily Note    Patient: Iveth Chua : 1975  CSN #: [id number]   Referring Practitioner:  Jacques Otero MD    Referral Date : 21     Date: 3/23/2021    Diagnosis: Cystic meniscus, other medial meniscus of L knee, M23.032, Other tear of medial meniscus of L knee as current injury, J40.507V  Treatment Diagnosis: L knee pain    Onset Date: 20  PT Insurance Information: BWC: work conditioning x12 visits  Total # of Visits Approved: 12 Per Physician Order  Total # of Visits to Date: 6  No Show: 0  Canceled Appointment: 0      Pre-Treatment Pain:  3/10  Subjective: Patient reports 3/10 knee pain coming into therapy. He isn't sure why he has increased discomfort today. He reports he was sore for about 24 hours after adding the [employer]ber last visit. Exercises:  Exercise 20: *See exercise sheet that will be scanned into chart at a later date *    Assessment  Body structures, Functions, Activity limitations: Decreased functional mobility , Increased pain, Decreased ADL status, Decreased balance, Decreased ROM, Decreased strength, Decreased endurance  Assessment: Continued exercises to work toward his goals. He was able to increase repetitions of exercises to work toward his strength and endurance goals. Activity Tolerance  Activity Tolerance: Patient limited by pain    Patient Education  Patient Education: Ice PRN  Pt verbalized/demonstrated good understanding:     [x] Yes         [] No, pt required further clarification.     Post Treatment Pain:  3/10      Plan  Times per week: 3  Plan weeks: 4      Goals  (Total # of Visits to Date: 6)      Short term goals  Time Frame for Short term goals: 2 weeks  Short term goal 1: Patient will tolerate 1 hour of work conditioning -MET  Short term goal 2: Patient will improve L knee flexion to >/=120*    Long term goals  Time Frame for Long term goals : 4 weeks  Long term goal 1: Patient will tolerate 3 hours of work conditioning to improve endurance for work activities  Long term goal 2: Patient will improve L knee strength to > 4/5 in all major joints and planes  Long term goal 3: Patient will be able to lift and carry 50# without gait deviations  Long term goal 4: Patient will be able to tolerate 5 minutes on the [employer] to simulate ladder climbing  Long term goal 5: Pt to ascend/descend 6 inch steps without use of handrail. - partly met    Minutes Tracking:  Time In: 0845  Time Out: 1000  Minutes: 75  Timed Code Treatment Minutes: 74 [address] Al[address]a Gunderson PT, DPT     Date: 3/23/2021

## 2021-03-25 ENCOUNTER — HOSPITAL ENCOUNTER (OUTPATIENT)
Dept: PHYSICAL THERAPY | Age: 46
Setting detail: THERAPIES SERIES
Discharge: HOME | End: 2021-03-25
Payer: COMMERCIAL

## 2021-03-25 PROCEDURE — W0710 HC WORK COND PROG PER 15 MIN: HCPCS

## 2021-03-25 NOTE — PROGRESS NOTES
planes  Long term goal 3: Patient will be able to lift and carry 50# without gait deviations  Long term goal 4: Patient will be able to tolerate 5 minutes on the [employer] to simulate ladder climbing  Long term goal 5: Pt to ascend/descend 6 inch steps without use of handrail. - partly met    Minutes Tracking:  Time In: 0805  Time Out: 450 [facility]  Minutes: 85  Timed Code Treatment Minutes: 70 Minutes   Treatment Charges: Code Total Mins Units Time In/Out   [] Evaluation       []  Low       []  Moderate       []  High  [] Re-Evaluation   73338  22534  33984  28026      []  Ther Exercise 01393      []  Manual Therapy 05163        []  Unattend Estim 67505      []  Ultrasound 29329      []  Iontophoresis 21517      []  Neuro Kevin 83042      []  Aquatics 15024      []  Traction 37030      [x]  Work Hardening    []   138 243      Total Treatment time  70 min            Levar Jackson PT, DPT     Date: 3/25/2021

## 2021-03-26 ENCOUNTER — HOSPITAL ENCOUNTER (OUTPATIENT)
Dept: PHYSICAL THERAPY | Age: 46
Setting detail: THERAPIES SERIES
Discharge: HOME | End: 2021-03-26
Payer: COMMERCIAL

## 2021-03-26 PROCEDURE — W0710 HC WORK COND PROG PER 15 MIN: HCPCS

## 2021-03-26 NOTE — PROGRESS NOTES
Phone: [facility]           Fax: [phone]                           Outpatient Physical Therapy                                                                            Daily Note    Patient: Grover Lepe : 1975  CSN #: [id number]   Referring Practitioner:  Sharmila Diallo MD    Referral Date : 21     Date: 3/26/2021    Diagnosis: Cystic meniscus, other medial meniscus of L knee, M23.032, Other tear of medial meniscus of L knee as current injury, E03.837P  Treatment Diagnosis: L knee pain    Onset Date: 20  PT Insurance Information: BWC: work conditioning x12 visits  Total # of Visits Approved: 12 Per Physician Order  Total # of Visits to Date: 8  No Show: 0  Canceled Appointment: 0    Pre-Treatment Pain:  3/10  Subjective: Patient reports he is \"sore\" today. He reports knee sorness rates at a 3/10. Exercises:  Exercise 20: *See exercise sheet that will be scanned into chart at a later date *    Assessment  Body structures, Functions, Activity limitations: Decreased functional mobility , Increased pain, Decreased ADL status, Decreased balance, Decreased ROM, Decreased strength, Decreased endurance  Assessment: Patient progressed with repetitions of exercises to continue to work toward his strength and endurance goals. Knee flexion ROM measured 122* in supine, but patient reported pain at end range. He was educated to ice PRN. Activity Tolerance  Activity Tolerance: Patient limited by pain    Patient Education  Patient Education: Ice PRN  Pt verbalized/demonstrated good understanding:     [x] Yes         [] No, pt required further clarification.     Post Treatment Pain:  3/10      Plan  Times per week: 3  Plan weeks: 4      Goals  (Total # of Visits to Date: 8)      Short term goals  Time Frame for Short term goals: 2 weeks  Short term goal 1: Patient will tolerate 1 hour of work conditioning -MET  Short term goal 2: Patient will improve L knee flexion to >/=120* -MET    Long term goals  Time Frame for Long term goals : 4 weeks  Long term goal 1: Patient will tolerate 3 hours of work conditioning to improve endurance for work activities  Long term goal 2: Patient will improve L knee strength to > 4/5 in all major joints and planes  Long term goal 3: Patient will be able to lift and carry 50# without gait deviations  Long term goal 4: Patient will be able to tolerate 5 minutes on the [employer] to simulate ladder climbing  Long term goal 5: Pt to ascend/descend 6 inch steps without use of handrail. - partly met    Minutes Tracking:  Time In: 0805  Time Out: [address]  Minutes: 80  Timed Code Treatment Minutes: 76 Minutes  Treatment Charges: Code Total Mins Units Time In/Out   [] Evaluation       []  Low       []  Moderate       []  High  [] Re-Evaluation   20793  28972  91719  92856      []  Ther Exercise 34717      []  Manual Therapy 05787        []  Unattend Estim 64263      []  Ultrasound 49922      []  Iontophoresis 31511      []  Neuro Kevin 11404      []  Aquatics 92436      []  Traction 41782      [x]  Work Hardening 82670 12 3 5510-5746   []  FCE 42534      Total Treatment time  76 min          Levar Jackson PT, DPT     Date: 3/26/2021

## 2021-03-29 ENCOUNTER — HOSPITAL ENCOUNTER (OUTPATIENT)
Dept: PHYSICAL THERAPY | Age: 46
Setting detail: THERAPIES SERIES
Discharge: HOME | End: 2021-03-29
Payer: COMMERCIAL

## 2021-03-29 PROCEDURE — W0710 HC WORK COND PROG PER 15 MIN: HCPCS

## 2021-03-29 NOTE — PROGRESS NOTES
Phone: Cristobal           Fax: [phone]                           Outpatient Physical Therapy                                                                            Daily Note    Patient: Cyndra Sandifer : 1975  CSN #: [id number]   Referring Practitioner:  Franci Hunt MD    Referral Date : 21     Date: 3/29/2021    Diagnosis: Cystic meniscus, other medial meniscus of L knee, M23.032, Other tear of medial meniscus of L knee as current injury, H13.749H  Treatment Diagnosis: L knee pain    Onset Date: 20  PT Insurance Information: [employer]: work conditioning x12 visits  Total # of Visits Approved: 12 Per Physician Order  Total # of Visits to Date: 9  No Show: 0  Canceled Appointment: 0      Pre-Treatment Pain:  2/10  Subjective: Patient reports he feels his normal pain at 2/10 coming into therapy today. Exercises:  Exercise 20: *See exercise sheet that will be scanned into chart at a later date *    Assessment  Body structures, Functions, Activity limitations: Decreased functional mobility , Increased pain, Decreased ADL status, Decreased balance, Decreased ROM, Decreased strength, Decreased endurance  Assessment: Patient progressed with repetitions and resistance with exercises to work toward his long term goals. Patient has been able to tolerate over an hour and 15 minutes of exercises working toward his endurance goals. Activity Tolerance  Activity Tolerance: Patient limited by pain    Patient Education  Patient Education: Ice PRN  Pt verbalized/demonstrated good understanding:     [x] Yes         [] No, pt required further clarification.     Post Treatment Pain:  2-3/10      Plan  Times per week: 3  Plan weeks: 4      Goals  (Total # of Visits to Date: 5)      Short term goals  Time Frame for Short term goals: 2 weeks  Short term goal 1: Patient will tolerate 1 hour of work conditioning -MET  Short term goal 2: Patient will improve L knee flexion to >/=120* -MET    Long term goals  Time Frame for Long term goals : 4 weeks  Long term goal 1: Patient will tolerate 3 hours of work conditioning to improve endurance for work activities  Long term goal 2: Patient will improve L knee strength to > 4/5 in all major joints and planes  Long term goal 3: Patient will be able to lift and carry 50# without gait deviations  Long term goal 4: Patient will be able to tolerate 5 minutes on the [employer] to simulate ladder climbing  Long term goal 5: Pt to ascend/descend 6 inch steps without use of handrail. - partly met    Minutes Tracking:  Time In: 0955  Time Out: 1110  Minutes: 75  Timed Code Treatment Minutes: 60 Minutes  Treatment Charges: Code Total Mins Units Time In/Out   [] Evaluation       []  Low       []  Moderate       []  High  [] Re-Evaluation   28104  76013 72071 30941      []  Ther Exercise 50971      []  Manual Therapy 62703        []  Unattend Estim 50269      []  Ultrasound 76655      []  Iontophoresis 66248      []  Neuro Kevin 18942      []  Aquatics 84338      []  Traction 84040      [x]  Work Hardening 22170 53 7 [phone]   []  FCE 90496      Total Treatment time  60 min          Jerica An PT, DPT     Date: 3/29/2021

## 2021-04-01 ENCOUNTER — HOSPITAL ENCOUNTER (OUTPATIENT)
Dept: PHYSICAL THERAPY | Age: 46
Setting detail: THERAPIES SERIES
Discharge: HOME | End: 2021-04-01
Payer: COMMERCIAL

## 2021-04-01 PROCEDURE — W0710 HC WORK COND PROG PER 15 MIN: HCPCS

## 2021-04-01 NOTE — PROGRESS NOTES
Phone: Cristobal           Fax: [phone]                           Outpatient Physical Therapy                                                                            Daily Note    Patient: Demarcus Padron : 1975  CSN #: [id number]   Referring Practitioner:  Odilia Mcghee MD    Referral Date : 21     Date: 2021    Diagnosis: Cystic meniscus, other medial meniscus of L knee, M23.032, Other tear of medial meniscus of L knee as current injury, L05.652J  Treatment Diagnosis: L knee pain    Onset Date: 20  PT Insurance Information: [employer]: work conditioning x18 visits  Total # of Visits Approved: 18 Per Physician Order  Total # of Visits to Date: 10  No Show: 0  Canceled Appointment: 0      Pre-Treatment Pain:  1/10  Subjective: Patient reports 1/10 knee pain coming into therapy today. 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 Exercises:  Exercise 20: *See exercise sheet that will be scanned into chart at a later date *    Assessment  Body structures, Functions, Activity limitations: Decreased functional mobility , Increased pain, Decreased ADL status, Decreased balance, Decreased ROM, Decreased strength, Decreased endurance  Assessment: Patient able to progress with repetitions with exercises to continue to work toward his strength and endurance goals. Patient returned to surgeon who has planned a return to work at the beginning of next month. Activity Tolerance  Activity Tolerance: Patient Tolerated treatment well    Patient Education  Patient Education: Ice PRN  Pt verbalized/demonstrated good understanding:     [x] Yes         [] No, pt required further clarification.     Post Treatment Pain:  /10      Plan  Times per week: 3  Plan weeks: 4      Goals  (Total # of Visits to Date: 10)      Short term goals  Time Frame for Short term goals: 2 weeks  Short term goal 1: Patient will tolerate 1 hour of work conditioning -MET  Short term goal 2: Patient will improve L knee flexion to >/=120* -MET    Long term goals  Time Frame for Long term goals : 4 weeks  Long term goal 1: Patient will tolerate 3 hours of work conditioning to improve endurance for work activities  Long term goal 2: Patient will improve L knee strength to > 4/5 in all major joints and planes  Long term goal 3: Patient will be able to lift and carry 50# without gait deviations  Long term goal 4: Patient will be able to tolerate 5 minutes on the [employer] to simulate ladder climbing  Long term goal 5: Pt to ascend/descend 6 inch steps without use of handrail. - partly met    Minutes Tracking:  Time In: 2430  Time Out: 0910  Minutes: 75  Timed Code Treatment Minutes: 60 Minutes    Treatment Charges: Code Total Mins Units Time In/Out   [] Evaluation       []  Low       []  Moderate       []  High  [] Re-Evaluation   34770  30361  15271  14006      []  Ther Exercise 20511      []  Manual Therapy 70080        []  Unattend Estim 03396      []  Ultrasound 84552      []  Iontophoresis 27192      []  Neuro Kevin 18412      []  Aquatics 19893      []  Traction 75674      [x]  Work [address] [address] 08 supervised 4 [phone]   []  FCE 68543      Total Treatment time  60 min          Asha James PT, DPT     Date: 4/1/2021

## 2021-04-02 ENCOUNTER — APPOINTMENT (OUTPATIENT)
Dept: PHYSICAL THERAPY | Age: 46
End: 2021-04-02
Payer: COMMERCIAL

## 2021-04-06 ENCOUNTER — HOSPITAL ENCOUNTER (OUTPATIENT)
Dept: PHYSICAL THERAPY | Age: 46
Setting detail: THERAPIES SERIES
Discharge: HOME | End: 2021-04-06
Payer: COMMERCIAL

## 2021-04-06 PROCEDURE — W0710 HC WORK COND PROG PER 15 MIN: HCPCS

## 2021-04-06 NOTE — PROGRESS NOTES
term goals : 4 weeks  Long term goal 1: Patient will tolerate 3 hours of work conditioning to improve endurance for work activities  Long term goal 2: Patient will improve L knee strength to > 4/5 in all major joints and planes  Long term goal 3: Patient will be able to lift and carry 50# without gait deviations  Long term goal 4: Patient will be able to tolerate 5 minutes on the [employer] to simulate ladder climbing  Long term goal 5: Pt to ascend/descend 6 inch steps without use of handrail. - partly met    Minutes Tracking:  Time In: 0755  Time Out: 0930  Minutes: 95  Timed Code Treatment Minutes: 60 Minutes    Treatment Charges: Code Total Mins Units Time In/Out   [] Evaluation       []  Low       []  Moderate       []  High  [] Re-Evaluation   37126  08232  09712  08619      []  Ther Exercise 61870      []  Manual Therapy 63223        []  Unattend Estim 39171      []  Ultrasound 04845      []  Iontophoresis 65631      []  Neuro Kevin 38407      []  Aquatics 12657      []  Traction 92804      [x]  Work Hardening 53246 [phone]   []  FCE 54663      Total Treatment time  60 min          Kiera Persaud PT, DPT     Date: 4/6/2021

## 2021-04-08 ENCOUNTER — HOSPITAL ENCOUNTER (OUTPATIENT)
Dept: PHYSICAL THERAPY | Age: 46
Setting detail: THERAPIES SERIES
Discharge: HOME | End: 2021-04-08
Payer: COMMERCIAL

## 2021-04-08 PROCEDURE — W0710 HC WORK COND PROG PER 15 MIN: HCPCS

## 2021-04-08 NOTE — PROGRESS NOTES
>/=120* -MET    Long term goals  Time Frame for Long term goals : 4 weeks  Long term goal 1: Patient will tolerate 3 hours of work conditioning to improve endurance for work activities  [address] term goal 2: Patient will improve L knee strength to > 4/5 in all major joints and planes  Long term goal 3: Patient will be able to lift and carry 50# without gait deviations  Long term goal 4: Patient will be able to tolerate 5 minutes on the [employer] to simulate ladder climbing  Long term goal 5: Pt to ascend/descend 6 inch steps without use of handrail. - partly met    Minutes Tracking:  Time In: 0397  Time Out: 0930  Minutes: 95  Timed Code Treatment Minutes: 60 Minutes  Treatment Charges: Code Total Mins Units Time In/Out   [] Evaluation       []  Low       []  Moderate       []  High  [] Re-Evaluation   38958  01293  31174  19726      []  Ther Exercise 57840      []  Manual Therapy 26937        []  Unattend Estim 29739      []  Ultrasound 07152      []  Iontophoresis 45944      []  Neuro Kevin 18566      []  Aquatics 76398      []  Traction 99659      [x]  Work Hardening [phone]   []  FCE 95259      Total Treatment time  60 min          Samantha Morris PT, DPT     Date: 4/8/2021
mickey

## 2021-04-09 ENCOUNTER — HOSPITAL ENCOUNTER (OUTPATIENT)
Dept: PHYSICAL THERAPY | Age: 46
Setting detail: THERAPIES SERIES
Discharge: HOME | End: 2021-04-09
Payer: COMMERCIAL

## 2021-04-09 PROCEDURE — W0710 HC WORK COND PROG PER 15 MIN: HCPCS

## 2021-04-09 NOTE — PROGRESS NOTES
Phone: Cristobal           Fax: [phone]                           Outpatient Physical Therapy                                                                            Daily Note    Patient: Mario Godoy : 1975  CSN #: [id number]   Referring Practitioner:  Jayy Cox MD    Referral Date : 21     Date: 2021    Diagnosis: Cystic meniscus, other medial meniscus of L knee, M23.032, Other tear of medial meniscus of L knee as current injury, O37.657S  Treatment Diagnosis: L knee pain    Onset Date: 20  PT Insurance Information: [facility]: work conditioning x18 visits  Total # of Visits Approved: 18 Per Physician Order  Total # of Visits to Date: 13  No Show: 0  Canceled Appointment: 0      Pre-Treatment Pain:  2-3/10  Subjective: Patient reports increased soreness, pain, and popping coming into therapy today. He reports 2-3/10 pain coming into therapy. Exercises:  Exercise 20: *See exercise sheet that will be scanned into chart at a later date *    Assessment  Body structures, Functions, Activity limitations: Decreased functional mobility , Increased pain, Decreased ADL status, Decreased balance, Decreased ROM, Decreased strength, Decreased endurance  Assessment: Patient with increased soreness and pain coming into therapy today. Patient maintained current exercise program with increases in SciFIT bike time to work toward his endurance goals and he progressed with repetitions of lunges to work toward his strength goals. Patient educated to ice as needed. Activity Tolerance  Activity Tolerance: Patient limited by pain    Patient Education  Patient Education: Ice PRN  Pt verbalized/demonstrated good understanding:     [x] Yes         [] No, pt required further clarification.        Post Treatment Pain:  3/10      Plan  Times per week: 3  Plan weeks: 4      Goals  (Total # of Visits to Date: 15)      Short term goals  Time Frame for Short term goals: 2 weeks  Short term goal 1: Patient will tolerate 1 hour of work conditioning -MET  Short term goal 2: Patient will improve L knee flexion to >/=120* -MET    Long term goals  Time Frame for Long term goals : 4 weeks  Long term goal 1: Patient will tolerate 3 hours of work conditioning to improve endurance for work activities  Long term goal 2: Patient will improve L knee strength to > 4/5 in all major joints and planes  Long term goal 3: Patient will be able to lift and carry 50# without gait deviations  Long term goal 4: Patient will be able to tolerate 5 minutes on the [employer] to simulate ladder climbing  Long term goal 5: Pt to ascend/descend 6 inch steps without use of handrail. - partly met    Minutes Tracking:  Time In: 0810  Time Out: 1000  Minutes: 110  Timed Code Treatment Minutes: 60 Minutes  Treatment Charges: Code Total Mins Units Time In/Out   [] Evaluation       []  Low       []  Moderate       []  High  [] Re-Evaluation   35139  01770  23440  48854      []  Ther Exercise 79986      []  Manual Therapy 68165        []  Unattend Estim 42531      []  Ultrasound 17926      []  Iontophoresis 92713      []  Neuro Kevin 22070      []  Aquatics 88367      []  Traction 99975      [x]  Work Hardening 52187 28 [phone]   []  FCE 55974      Total Treatment time  60 min          Birdie Marvin PT, DPT     Date: 4/9/2021

## 2021-04-13 ENCOUNTER — HOSPITAL ENCOUNTER (OUTPATIENT)
Dept: PHYSICAL THERAPY | Age: 46
Setting detail: THERAPIES SERIES
Discharge: HOME | End: 2021-04-13
Payer: COMMERCIAL

## 2021-04-13 PROCEDURE — W0710 HC WORK COND PROG PER 15 MIN: HCPCS

## 2021-04-13 NOTE — PROGRESS NOTES
Phone: Cristobal           Fax: [phone]                           Outpatient Physical Therapy                                                                            Daily Note    Patient: Brianna Peres : 1975  CSN #: [id number]   Referring Practitioner:  Sari Bella MD    Referral Date : 21     Date: 2021    Diagnosis: Cystic meniscus, other medial meniscus of L knee, M23.032, Other tear of medial meniscus of L knee as current injury, R46.430B  Treatment Diagnosis: L knee pain    Onset Date: 20  PT Insurance Information: [employer]: work conditioning x18 visits  Total # of Visits Approved: 18 Per Physician Order  Total # of Visits to Date: 14  No Show: 0  Canceled Appointment: 0      Pre-Treatment Pain:  2-3/10  Subjective: Patient reports increased pain due to his wife having surgery and having to be on his feet a lot. He reports 2-3/10 pain coming into therapy today. Exercises:  Exercise 20: *See exercise sheet that will be scanned into chart at a later date *    Assessment  Body structures, Functions, Activity limitations: Decreased functional mobility , Increased pain, Decreased ADL status, Decreased balance, Decreased ROM, Decreased strength, Decreased endurance  Assessment: Patient with increased pain today and reports he didn't sleep much due to his wife having a surgery. He was able to increase reps with lateral walkouts and increased box carry weight. Activity Tolerance  Activity Tolerance: Patient limited by pain    Patient Education  Patient Education: Ice PRN  Pt verbalized/demonstrated good understanding:     [x] Yes         [] No, pt required further clarification.     Post Treatment Pain:  3/10      Plan  Times per week: 3  Plan weeks: 4      Goals  (Total # of Visits to Date: 15)      Short term goals  Time Frame for Short term goals: 2 weeks  Short term goal 1: Patient will tolerate 1 hour of work conditioning -MET  Short term goal 2: Patient will improve L knee flexion to >/=120* -MET    Long term goals  Time Frame for Long term goals : 4 weeks  Long term goal 1: Patient will tolerate 3 hours of work conditioning to improve endurance for work activities  Long term goal 2: Patient will improve L knee strength to > 4/5 in all major joints and planes  Long term goal 3: Patient will be able to lift and carry 50# without gait deviations  Long term goal 4: Patient will be able to tolerate 5 minutes on the [employer] to simulate ladder climbing  Long term goal 5: Pt to ascend/descend 6 inch steps without use of handrail. - partly met    Minutes Tracking:  Time In: 0800  Time Out: 0955  Minutes: 115  Timed Code Treatment Minutes: 15 Minutes  Treatment Charges: Code Total Mins Units Time In/Out   [] Evaluation       []  Low       []  Moderate       []  High  [] Re-Evaluation   69811  89788 66211  24723      []  Ther Exercise 19719      []  Manual Therapy 96969        []  Unattend ABBYD 55126      []  Ultrasound 13731      []  Iontophoresis 39059      []  Neuro Kevin 50997      []  Aquatics 35583      []  Traction 08134      [x]  Work Hardening 89792 [phone]   []  FCE 32399      Total Treatment time  15 min          Graham Monsalve PT, DPT     Date: 4/13/2021

## 2021-04-15 ENCOUNTER — HOSPITAL ENCOUNTER (OUTPATIENT)
Dept: PHYSICAL THERAPY | Age: 46
Setting detail: THERAPIES SERIES
Discharge: HOME | End: 2021-04-15
Payer: COMMERCIAL

## 2021-04-15 PROCEDURE — W0710 HC WORK COND PROG PER 15 MIN: HCPCS

## 2021-04-15 NOTE — PROGRESS NOTES
Phone: Cristobal           Fax: [phone]                           Outpatient Physical Therapy                                                                            Daily Note    Patient: Corine Rizzo : 1975  CSN #: [id number]   Referring Practitioner:  Daly Patterson MD    Referral Date : 21     Date: 4/15/2021    Diagnosis: Cystic meniscus, other medial meniscus of L knee, M23.032, Other tear of medial meniscus of L knee as current injury, J73.642G  Treatment Diagnosis: L knee pain    Onset Date: 20  PT Insurance Information: [facility]: work conditioning x18 visits  Total # of Visits Approved: 18 Per Physician Order  Total # of Visits to Date: 15  No Show: 0  Canceled Appointment: 0      Pre-Treatment Pain:  3/10  Subjective: Patient reports he was outside doing yardwork yesterday and reports increased soreness coming into therapy today. He reports 3/10 knee pain. Exercises:  Exercise 20: *See exercise sheet that will be scanned into chart at a later date *    Assessment  Body structures, Functions, Activity limitations: Decreased functional mobility , Increased pain, Decreased ADL status, Decreased balance, Decreased ROM, Decreased strength, Decreased endurance  Assessment: Patient reports increased knee pain today coming into therapy. He had to decrease time on the [employer] due to pain. He was able to maintain reps and resistance with all other exercises. He was educated to ice PRN. Activity Tolerance  Activity Tolerance: Patient limited by pain    Patient Education  Patient Education: Ice PRN  Pt verbalized/demonstrated good understanding:     [x] Yes         [] No, pt required further clarification.     Post Treatment Pain:  3/10      Plan  Times per week: 3  Plan weeks: 4      Goals  (Total # of Visits to Date: 13)      Short term goals  Time Frame for Short term goals: 2 weeks  Short term goal 1: Patient will tolerate 1 hour of work conditioning -MET  Short term goal 2: Patient will improve L knee flexion to >/=120* -MET    Long term goals  Time Frame for Long term goals : 4 weeks  Long term goal 1: Patient will tolerate 3 hours of work conditioning to improve endurance for work activities  Long term goal 2: Patient will improve L knee strength to > 4/5 in all major joints and planes  Long term goal 3: Patient will be able to lift and carry 50# without gait deviations  Long term goal 4: Patient will be able to tolerate 5 minutes on the [employer] to simulate ladder climbing  Long term goal 5: Pt to ascend/descend 6 inch steps without use of handrail. - partly met    Minutes Tracking:  Time In: 1853  Time Out: 0930  Minutes: 95  Timed Code Treatment Minutes: 75 Minutes  Treatment Charges: Code Total Mins Units Time In/Out   [] Evaluation       []  Low       []  Moderate       []  High  [] Re-Evaluation   67759  99441  34764  41189      []  Ther Exercise 74630      []  Manual Therapy 38485        []  Unattend Estim 32444      []  Ultrasound 20754      []  Iontophoresis 71277      []  Neuro Kevin 87194      []  Aquatics 11584      []  Traction 64388      [x]  Work Hardening 68777 03 [phone]   []  FCE 00163      Total Treatment time  75 min          Samantha Morris PT, DPT     Date: 4/15/2021

## 2021-04-16 ENCOUNTER — HOSPITAL ENCOUNTER (OUTPATIENT)
Dept: PHYSICAL THERAPY | Age: 46
Setting detail: THERAPIES SERIES
Discharge: HOME | End: 2021-04-16
Payer: COMMERCIAL

## 2021-04-16 PROCEDURE — W0710 HC WORK COND PROG PER 15 MIN: HCPCS

## 2021-04-16 NOTE — PROGRESS NOTES
Phone: Cristobal           Fax: [phone]                           Outpatient Physical Therapy                                                                            Daily Note    Patient: Bland Nissen : 1975  CSN #: [id number]   Referring Practitioner:  Lieutenant Zeny MD    Referral Date : 21     Date: 2021    Diagnosis: Cystic meniscus, other medial meniscus of L knee, M23.032, Other tear of medial meniscus of L knee as current injury, B89.478S  Treatment Diagnosis: L knee pain    Onset Date: 20  PT Insurance Information: [employer]: work conditioning x18 visits  Total # of Visits Approved: 18 Per Physician Order  Total # of Visits to Date: 16  No Show: 0  Canceled Appointment: 0      Pre-Treatment Pain:  3/10  Subjective: Patient reports he, \"feels the same. \"  He reports 3/10 knee pain coming into therapy today. Exercises:  Exercise 20: *See exercise sheet that will be scanned into chart at a later date *    Assessment  Body structures, Functions, Activity limitations: Decreased functional mobility , Increased pain, Decreased ADL status, Decreased balance, Decreased ROM, Decreased strength, Decreased endurance  Assessment: Patient able to increase load with box carry today, increased speed on the treadmill and increased reps with retro walkouts. He continues to report knee pain that limits his progression. Activity Tolerance  Activity Tolerance: Patient limited by pain    Patient Education  Patient Education: Ice PRN  Pt verbalized/demonstrated good understanding:     [x] Yes         [] No, pt required further clarification.     Post Treatment Pain:  3/10      Plan  Times per week: 3  Plan weeks: 4      Goals  (Total # of Visits to Date: 12)      Short term goals  Time Frame for Short term goals: 2 weeks  Short term goal 1: Patient will tolerate 1 hour of work conditioning -MET  Short term goal 2: Patient will improve L knee flexion to

## 2021-04-20 ENCOUNTER — HOSPITAL ENCOUNTER (OUTPATIENT)
Dept: PHYSICAL THERAPY | Age: 46
Setting detail: THERAPIES SERIES
Discharge: HOME | End: 2021-04-20
Payer: COMMERCIAL

## 2021-04-20 PROCEDURE — W0710 HC WORK COND PROG PER 15 MIN: HCPCS

## 2021-04-20 NOTE — PROGRESS NOTES
Phone: Cristobal           Fax: [phone]                           Outpatient Physical Therapy                                                                            Daily Note    Patient: Aneudy Dominguez : 1975  CSN #: [id number]   Referring Practitioner:  Snow Saundesr MD    Referral Date : 21     Date: 2021    Diagnosis: Cystic meniscus, other medial meniscus of L knee, M23.032, Other tear of medial meniscus of L knee as current injury, P79.034X  Treatment Diagnosis: L knee pain    Onset Date: 20  PT Insurance Information: [employer]: work conditioning x18 visits  Total # of Visits Approved: 18 Per Physician Order  Total # of Visits to Date: 17  No Show: 0  Canceled Appointment: 0      Pre-Treatment Pain:  2/10  Subjective: Patient reports it was a busy morning, but reports his knee isn't doing too bad today. He rates pain at 2/10 coming into therapy today. Exercises:  Exercise 20: *See exercise sheet that will be scanned into chart at a later date *    Assessment  Body structures, Functions, Activity limitations: Decreased functional mobility , Increased pain, Decreased ADL status, Decreased balance, Decreased ROM, Decreased strength, Decreased endurance  Assessment: Patient able to increase reps with step ups and time on SciFIT to work toward his endurance goals. Plan to get measures at patient's next visit. Activity Tolerance  Activity Tolerance: Patient Tolerated treatment well    Patient Education  Patient Education: Ice PRN  Pt verbalized/demonstrated good understanding:     [x] Yes         [] No, pt required further clarification.     Post Treatment Pain:  2/10      Plan  Times per week: 3  Plan weeks: 4      Goals  (Total # of Visits to Date: 16)      Short term goals  Time Frame for Short term goals: 2 weeks  Short term goal 1: Patient will tolerate 1 hour of work conditioning -MET  Short term goal 2: Patient will improve L knee flexion to >/=120* -MET    Long term goals  Time Frame for Long term goals : 4 weeks  Long term goal 1: Patient will tolerate 3 hours of work conditioning to improve endurance for work activities  Long term goal 2: Patient will improve L knee strength to > 4/5 in all major joints and planes  Long term goal 3: Patient will be able to lift and carry 50# without gait deviations  Long term goal 4: Patient will be able to tolerate 5 minutes on the [employer] to simulate ladder climbing  Long term goal 5: Pt to ascend/descend 6 inch steps without use of handrail. - partly met    Minutes Tracking:  Time In: 0900  Time Out: 1100  Minutes: 120  Timed Code Treatment Minutes: 24 Minutes    Treatment Charges: Code Total Mins Units Time In/Out   [] Evaluation       []  Low       []  Moderate       []  High  [] Re-Evaluation   03636  [id number] 52942 47203      []  Ther Exercise 45098      []  Manual Therapy 73568        []  Unattend Estim 93555      []  Ultrasound 90760      []  Iontophoresis 96672      []  Neuro Kevin 32589      []  Aquatics 95405      []  Traction 66436      [x]  Work Hardening [phone] 24 2 21-23-83-89, 1404-2830   []  FCE 63480      Total Treatment time  24 min (supervised)          Graham Monsalve PT, DPT     Date: 4/20/2021

## 2021-04-22 ENCOUNTER — HOSPITAL ENCOUNTER (OUTPATIENT)
Dept: PHYSICAL THERAPY | Age: 46
Setting detail: THERAPIES SERIES
Discharge: HOME | End: 2021-04-22
Payer: COMMERCIAL

## 2021-04-22 PROCEDURE — W0710 HC WORK COND PROG PER 15 MIN: HCPCS

## 2021-04-22 NOTE — PLAN OF CARE
-MET  Long term goal 4: Patient will be able to tolerate 5 minutes on the Versaclimber to simulate ladder climbing -PARTIALLY MET  Long term goal 5: Pt to ascend/descend 6 inch steps without use of handrail. - MET     Prognosis  Prognosis: Good    Treatment Plan   Times per week: 3  Plan weeks: 4  [] HP/CP      [] Electrical Stim   [x] Therapeutic Exercise -WORK CONDITIONING      [] Gait Training  [] Aquatics   [] Ultrasound         [x] Patient Education/HEP   [] Manual Therapy  [] Traction    [] Neuro-homar        [] Soft Tissue Mobs            [] Home TENS  [] Iontophoresis    [] Orthotic casting/fitting      [] Dry Needling             Electronically signed by: Sheree Arenas PT, DPT    Date: 4/22/2021      ______________________________________ Date: 4/22/2021   Physician Signature

## 2021-04-22 NOTE — PROGRESS NOTES
Phone: Cristobal           Fax: [phone]                           Outpatient Physical Therapy                                                                            Daily Note    Patient: Paris Borges : 1975  CSN #: [id number]   Referring Practitioner:  Tamera Palencia MD    Referral Date : 21     Date: 2021    Diagnosis: Cystic meniscus, other medial meniscus of L knee, M23.032, Other tear of medial meniscus of L knee as current injury, S83.242A  Treatment Diagnosis: L knee pain    Onset Date: 20  PT Insurance Information: [facility]: work conditioning x18 visits  Total # of Visits Approved: 18 Per Physician Order  Total # of Visits to Date: 18  No Show: 0  Canceled Appointment: 0      Pre-Treatment Pain:  2-3/10  Subjective: Patient reports 2-3/10 knee pain coming into therapy today and reports 2-3/10 is about average. He reports he continues to have crepitation of his patellafemoral joint. Exercises:  Exercise 20: *See exercise sheet that will be scanned into chart at a later date *    Assessment  Assessment: The patient has attended 18 appointments for work conditioning. He reports pain continues to average 2-3/10 and reports frequent crepitation. He has been able to work up to 2.5 hours of continuous work activities, knee ROM measures 5* of hyperextension to 124* of flexion in supine. He is able to lift 50# from floor to chest with proper form, is able to carry 50# over 50', perform the Versaclimber (to simulate ladder climbing) for 3 consecutive minutes. He is returning to physician for work clearance in the next week. Activity Tolerance  Activity Tolerance: Patient Tolerated treatment well    Patient Education  Patient Education: Ice PRN  Pt verbalized/demonstrated good understanding:     [x] Yes         [] No, pt required further clarification.     Post Treatment Pain:  2-3/10      Plan  Times per week: 3  Plan weeks: 4      Goals  (Total # of Visits to Date: 25)      Short term goals  Time Frame for Short term goals: 2 weeks  Short term goal 1: Patient will tolerate 1 hour of work conditioning -MET  Short term goal 2: Patient will improve L knee flexion to >/=120* -MET    Long term goals  Time Frame for Long term goals : 4 weeks  Long term goal 1: Patient will tolerate 3 hours of work conditioning to improve endurance for work activities -PARTIALLY MET (2.5 hours)  Long term goal 2: Patient will improve L knee strength to > 4/5 in all major joints and planes -MET  Long term goal 3: Patient will be able to lift and carry 50# without gait deviations -MET  Long term goal 4: Patient will be able to tolerate 5 minutes on the [employer]ber to simulate ladder climbing -PARTIALLY MET  Long term goal 5: Pt to ascend/descend 6 inch steps without use of handrail. - MET    Minutes Tracking:  Time In: 0820  Time Out: 1030  Minutes: 130  Timed Code Treatment Minutes: 60 Minutes     Treatment Charges: Code Total Mins Units Time In/Out   [] Evaluation       []  Low       []  Moderate       []  High  [] Re-Evaluation   77542  07353  25270  52521      []  Ther Exercise 12810      []  Manual Therapy 60909        []  Unattend Estim 82481      []  Ultrasound 94582      []  Iontophoresis 38072      []  Neuro Kevin 83903      []  Aquatics 64074      []  Traction 39518      [x]  Work Hardening 86744 [phone]   []  FCE 03760      Total Treatment time  60 min  supervised          Lynn Man PT, DPT     Date: 4/22/2021

## 2021-04-23 ENCOUNTER — APPOINTMENT (OUTPATIENT)
Dept: PHYSICAL THERAPY | Age: 46
End: 2021-04-23
Payer: COMMERCIAL

## 2021-09-30 NOTE — DISCHARGE SUMMARY
Phone: Cristobal          Fax: [phone]                            Outpatient Physical Therapy                                                                    Discharge Summary    Patient: Suzanne Moya  : 1975  CSN #: [id number]   Referring physician: No admitting provider for patient encounter. Referring Practitioner: Conrad Walters MD      Diagnosis: Cystic meniscus, other medial meniscus of L knee, M23.032, Other tear of medial meniscus of L knee as current injury, S83.242A      Date Treatment Initiated: 3/9/21  Date of Last Treatment: 21      PT Visit Information  Onset Date: 20  PT Insurance Information: [employer]: work conditioning x18 visits  Total # of Visits Approved: 18  Total # of Visits to Date: 25  Plan of Care/Certification Expiration Date: 21  No Show: 0  Canceled Appointment: 0      Frequency/Duration  3 times per week  4 weeks      Treatment Received  [x] HP/CP      [] Electrical Stim   [x] Therapeutic Exercise      [x] Gait Training  [] Aquatics   [] Ultrasound         [x] Patient Education/HEP   [x] Manual Therapy  [] Traction    [x] Neuro-homar        [] Soft Tissue Mobs            [] Home TENS  [] Iontophoresis    [] Orthotic casting/fitting      [] Dry Needling    Assessment  Assessment: Patient attended 18 PT visits for L knee pain and work conditioning and met goals for improved functional strength and endurance with continuous work activities. Patient returned to doctor for clearance to work and did not return for further PT. Will dc patient at this time d/t meeting all goals.        Goals  Short term goals  Time Frame for Short term goals: 2 weeks  Short term goal 1: Patient will tolerate 1 hour of work conditioning -MET  Short term goal 2: Patient will improve L knee flexion to >/=120* -MET    Long term goals  Time Frame for Long term goals : 4 weeks  Long term goal 1: Patient will tolerate 3 hours of work conditioning to improve endurance for work activities -PARTIALLY MET (2.5 hours)  Long term goal 2: Patient will improve L knee strength to > 4/5 in all major joints and planes -MET  Long term goal 3: Patient will be able to lift and carry 50# without gait deviations -MET  Long term goal 4: Patient will be able to tolerate 5 minutes on the Versaclimber to simulate ladder climbing -PARTIALLY MET  Long term goal 5: Pt to ascend/descend 6 inch steps without use of handrail. - MET      Reason for Discharge  [x] Goals Achieved                        []  Poor Follow Through/Attendance                  []  Optimal Function Achieved     []  Patient Discharged Self    []  Hospitalization                         []  Physician discharge      Thank you for this referral      Ramsey Gonzalez PT, DPT               Date: 9/30/2021

## 2023-06-12 PROBLEM — I10 HYPERTENSION: Status: ACTIVE | Noted: 2023-06-12

## 2023-06-12 PROBLEM — I50.43 ACUTE ON CHRONIC COMBINED SYSTOLIC AND DIASTOLIC CHF (CONGESTIVE HEART FAILURE) (HCC): Status: ACTIVE | Noted: 2023-06-12

## 2023-06-13 PROBLEM — I20.0 UNSTABLE ANGINA (HCC): Status: ACTIVE | Noted: 2023-06-13

## 2023-06-13 PROBLEM — J44.9 CHRONIC OBSTRUCTIVE PULMONARY DISEASE (HCC): Status: ACTIVE | Noted: 2023-06-13

## 2023-06-13 PROBLEM — R94.39 ABNORMAL CARDIOVASCULAR STRESS TEST: Status: ACTIVE | Noted: 2023-06-13

## 2023-08-17 ENCOUNTER — OFFICE VISIT (OUTPATIENT)
Dept: CARDIOLOGY | Age: 48
End: 2023-08-17
Payer: COMMERCIAL

## 2023-08-17 VITALS
OXYGEN SATURATION: 98 % | BODY MASS INDEX: 31.81 KG/M2 | HEIGHT: 73 IN | RESPIRATION RATE: 18 BRPM | WEIGHT: 240 LBS | HEART RATE: 73 BPM | SYSTOLIC BLOOD PRESSURE: 133 MMHG | DIASTOLIC BLOOD PRESSURE: 87 MMHG

## 2023-08-17 DIAGNOSIS — Z95.1 S/P CABG X 3: ICD-10-CM

## 2023-08-17 DIAGNOSIS — J44.9 CHRONIC OBSTRUCTIVE PULMONARY DISEASE, UNSPECIFIED COPD TYPE (HCC): Primary | ICD-10-CM

## 2023-08-17 DIAGNOSIS — I25.10 ASHD (ARTERIOSCLEROTIC HEART DISEASE): ICD-10-CM

## 2023-08-17 DIAGNOSIS — I25.5 ISCHEMIC CARDIOMYOPATHY: ICD-10-CM

## 2023-08-17 DIAGNOSIS — Z72.0 TOBACCO ABUSE: ICD-10-CM

## 2023-08-17 DIAGNOSIS — R06.02 SOB (SHORTNESS OF BREATH): ICD-10-CM

## 2023-08-17 DIAGNOSIS — I10 PRIMARY HYPERTENSION: ICD-10-CM

## 2023-08-17 PROCEDURE — 99214 OFFICE O/P EST MOD 30 MIN: CPT | Performed by: INTERNAL MEDICINE

## 2023-08-17 PROCEDURE — 3075F SYST BP GE 130 - 139MM HG: CPT | Performed by: INTERNAL MEDICINE

## 2023-08-17 PROCEDURE — 3079F DIAST BP 80-89 MM HG: CPT | Performed by: INTERNAL MEDICINE

## 2023-08-17 RX ORDER — ALLOPURINOL 100 MG/1
100 TABLET ORAL DAILY
COMMUNITY
Start: 2023-07-29

## 2023-08-17 RX ORDER — COLCHICINE 0.6 MG/1
0.3 TABLET ORAL DAILY
COMMUNITY
Start: 2023-07-02 | End: 2023-08-17 | Stop reason: ALTCHOICE

## 2023-08-17 RX ORDER — EZETIMIBE 10 MG/1
10 TABLET ORAL DAILY
COMMUNITY
Start: 2023-07-29

## 2023-08-17 RX ORDER — FUROSEMIDE 40 MG/1
TABLET ORAL
COMMUNITY
Start: 2023-07-01

## 2023-08-17 RX ORDER — ASPIRIN 81 MG/1
TABLET, CHEWABLE ORAL
COMMUNITY
Start: 2023-07-29

## 2023-08-17 RX ORDER — CLOPIDOGREL BISULFATE 75 MG/1
75 TABLET ORAL DAILY
COMMUNITY
Start: 2023-08-01

## 2023-08-17 RX ORDER — ATORVASTATIN CALCIUM 80 MG/1
80 TABLET, FILM COATED ORAL
COMMUNITY
Start: 2023-07-29

## 2023-08-17 NOTE — PATIENT INSTRUCTIONS
SURVEY:    You may be receiving a survey from [employer] regarding your visit today. Please complete the survey to enable us to provide the highest quality of care to you and your family. If you cannot score us a very good on any question, please call the office to discuss how we could have made your experience a very good one. Thank you.

## 2023-08-17 NOTE — PROGRESS NOTES
ejection fraction postsurgery. Patient has mildly reduced left ventricular systolic function prior to the bypass surgery. Discontinue colchicine. Start phase 2 cardiac rehab next week. We discussed return to work, no return to work before 3 months after surgery. Patient is a . Shortness of breath with almost any exertion:  No shortness of breath since his hospital visit. Hyperlipidemia: Mixed. LDL was 239 mg/dL on 6/13/2023  Cholesterol Reduction Therapy: Continue Atorvastatin (Lipitor) 80 mg daily. Continue Zetia 10 mg daily. Essential Hypertension: Controlled  Beta Blocker: Continue Metoprolol tartrate (Lopressor) 25 mg bid. We will continue to monitor blood pressure during cardiac rehab. Tobacco Abuse Counseling: I spent several minutes discussing the dangers of tobacco abuse as well as multiple methods for trying to quit smoking. In the end, Mr. Andres Nichole said that he He has smoked for about 25 years, he smoked a pack to a pack and a half daily. He has quit smoking since he was admitted to the hospital in June 2023. I told Mr. Ba to call my office if he had any problems, but otherwise told him to Return in about 3 months (around 11/17/2023). However, I would be happy to see him sooner should the need arise. Sincerely,  Joshua Bains MD, F.A.C.C. [facility] Cardiology Specialist    [address] [place] [address]  Phone: [phone], Fax: [phone]       I believe that the risk of significant morbidity and mortality related to the patient's current medical conditions are: intermediate-high. Approximately 40 minutes were spent during prep work, discussion and exam of the patient, and follow up documentation and all of their questions were answered. The documentation recorded by the scribe, accurately and completely reflects the services I personally performed and the decisions made by me. Joshua Bains MD, F.A.C.C.  August 17, 2023

## 2023-08-22 ENCOUNTER — HOSPITAL ENCOUNTER (OUTPATIENT)
Dept: CARDIAC REHAB | Age: 48
Setting detail: THERAPIES SERIES
Discharge: HOME | End: 2023-08-22
Payer: COMMERCIAL

## 2023-08-22 PROCEDURE — 93798 PHYS/QHP OP CAR RHAB W/ECG: CPT

## 2023-08-23 ENCOUNTER — HOSPITAL ENCOUNTER (OUTPATIENT)
Dept: CARDIAC REHAB | Age: 48
Setting detail: THERAPIES SERIES
Discharge: HOME | End: 2023-08-23
Payer: COMMERCIAL

## 2023-08-23 RX ORDER — EZETIMIBE 10 MG/1
10 TABLET ORAL DAILY
Qty: 90 TABLET | Refills: 3 | Status: SHIPPED | OUTPATIENT
Start: 2023-08-23

## 2023-08-23 RX ORDER — CLOPIDOGREL BISULFATE 75 MG/1
75 TABLET ORAL DAILY
Qty: 90 TABLET | Refills: 3 | Status: SHIPPED | OUTPATIENT
Start: 2023-08-23

## 2023-08-23 RX ORDER — ATORVASTATIN CALCIUM 80 MG/1
80 TABLET, FILM COATED ORAL
Qty: 90 TABLET | Refills: 3 | Status: SHIPPED | OUTPATIENT
Start: 2023-08-23

## 2023-08-24 ENCOUNTER — HOSPITAL ENCOUNTER (OUTPATIENT)
Dept: CARDIAC REHAB | Age: 48
Setting detail: THERAPIES SERIES
Discharge: HOME | End: 2023-08-24
Payer: COMMERCIAL

## 2023-08-24 PROCEDURE — 93798 PHYS/QHP OP CAR RHAB W/ECG: CPT

## 2023-08-28 ENCOUNTER — HOSPITAL ENCOUNTER (OUTPATIENT)
Dept: CARDIAC REHAB | Age: 48
Setting detail: THERAPIES SERIES
Discharge: HOME | End: 2023-08-28
Payer: COMMERCIAL

## 2023-08-28 PROCEDURE — 93798 PHYS/QHP OP CAR RHAB W/ECG: CPT

## 2023-08-30 ENCOUNTER — HOSPITAL ENCOUNTER (OUTPATIENT)
Dept: CARDIAC REHAB | Age: 48
Setting detail: THERAPIES SERIES
Discharge: HOME | End: 2023-08-30
Payer: COMMERCIAL

## 2023-08-30 PROCEDURE — 93798 PHYS/QHP OP CAR RHAB W/ECG: CPT

## 2023-08-31 ENCOUNTER — HOSPITAL ENCOUNTER (OUTPATIENT)
Dept: CARDIAC REHAB | Age: 48
Setting detail: THERAPIES SERIES
Discharge: HOME | End: 2023-08-31
Payer: COMMERCIAL

## 2023-08-31 PROCEDURE — 93798 PHYS/QHP OP CAR RHAB W/ECG: CPT

## 2023-09-04 ENCOUNTER — APPOINTMENT (OUTPATIENT)
Dept: CARDIAC REHAB | Age: 48
End: 2023-09-04
Payer: COMMERCIAL

## 2023-09-06 ENCOUNTER — APPOINTMENT (OUTPATIENT)
Dept: CARDIAC REHAB | Age: 48
End: 2023-09-06
Payer: COMMERCIAL

## 2023-09-07 ENCOUNTER — APPOINTMENT (OUTPATIENT)
Dept: CARDIAC REHAB | Age: 48
End: 2023-09-07
Payer: COMMERCIAL

## 2023-09-11 ENCOUNTER — APPOINTMENT (OUTPATIENT)
Dept: CARDIAC REHAB | Age: 48
End: 2023-09-11
Payer: COMMERCIAL

## 2023-09-13 ENCOUNTER — HOSPITAL ENCOUNTER (OUTPATIENT)
Dept: CARDIAC REHAB | Age: 48
Setting detail: THERAPIES SERIES
Discharge: HOME | End: 2023-09-13
Payer: COMMERCIAL

## 2023-09-13 PROCEDURE — 93798 PHYS/QHP OP CAR RHAB W/ECG: CPT

## 2023-09-14 ENCOUNTER — HOSPITAL ENCOUNTER (OUTPATIENT)
Dept: CARDIAC REHAB | Age: 48
Setting detail: THERAPIES SERIES
Discharge: HOME | End: 2023-09-14
Payer: COMMERCIAL

## 2023-09-14 PROCEDURE — 93798 PHYS/QHP OP CAR RHAB W/ECG: CPT

## 2023-09-14 NOTE — PROGRESS NOTES
Cardiopulmonary Rehab   Medical Nutrition Therapy Food Diary Evaluation    Patient Name: Lucas Barrett  Registered Dietitian:  Ron Clemons RD, PEYMAN, RDN, LD  Date:  9/14/2023    Dear Cailin Esparza,    Thank you for sharing your information about your eating patterns, it serves not only to help me see what you are eating, but you as well. Eating 3 meals a day is great way to start, I see that you are doing that. I noticed that you skip lunch. Try adding a low sodium turkey sandwich on whole wheat bread with lettuce and tomato, add some fruit too. Consider changing your cereal at breakfast to oatmeal or Crispex and add fruit. Make sure that you measure the serving size out to make sure you are following the recommended portions size. At bedtime consider adding cottage cheese with fruit for a healthier evening snack. Try adding a vegetable and fruit at supper. Whole grains, fruits and vegetables, along with lean meats and low fat dairy are the cornerstones of your health. I did not notice any whole grains, fruit, or vegetables on your food diary. Sausage is a higher fat meat, consider switching to ground turkey or chicken instead of sausage. Instead of cookies, use the snack for a healthier option such as With that in mind, look below for some suggestions. Your REAPS (Rapid Eating Assessment for Participants- short version) Score was: 29, which means: there are some ways to make your eating habits healthier    Recommendations from the Dietitian based on your REAPS and Food Diary / activity record to improve health and decrease risk factors    Limit sodium to less than 2,000 mg every day (added salt and hidden sodium combined). Include a minimum of 2 servings of non starchy vegetables such as broccoli, cauliflower, green beans, carrots, etc. every day (1/2 cup cooked, 1 cup raw). Add 3 servings of fruit every day (fresh, frozen, or canned in lite syrup or own juice). Drink 64 oz water every day.    Include

## 2023-09-18 ENCOUNTER — HOSPITAL ENCOUNTER (OUTPATIENT)
Dept: CARDIAC REHAB | Age: 48
Setting detail: THERAPIES SERIES
Discharge: HOME | End: 2023-09-18
Payer: COMMERCIAL

## 2023-09-18 PROCEDURE — 93798 PHYS/QHP OP CAR RHAB W/ECG: CPT

## 2023-09-20 ENCOUNTER — APPOINTMENT (OUTPATIENT)
Dept: CARDIAC REHAB | Age: 48
End: 2023-09-20
Payer: COMMERCIAL

## 2023-09-21 ENCOUNTER — HOSPITAL ENCOUNTER (OUTPATIENT)
Dept: CARDIAC REHAB | Age: 48
Setting detail: THERAPIES SERIES
Discharge: HOME | End: 2023-09-21
Payer: COMMERCIAL

## 2023-09-21 PROCEDURE — 93798 PHYS/QHP OP CAR RHAB W/ECG: CPT

## 2023-09-25 ENCOUNTER — HOSPITAL ENCOUNTER (OUTPATIENT)
Dept: CARDIAC REHAB | Age: 48
Setting detail: THERAPIES SERIES
Discharge: HOME | End: 2023-09-25
Payer: COMMERCIAL

## 2023-09-25 PROCEDURE — 93798 PHYS/QHP OP CAR RHAB W/ECG: CPT

## 2023-09-27 ENCOUNTER — HOSPITAL ENCOUNTER (OUTPATIENT)
Dept: CARDIAC REHAB | Age: 48
Setting detail: THERAPIES SERIES
Discharge: HOME | End: 2023-09-27
Payer: COMMERCIAL

## 2023-09-27 PROCEDURE — 93798 PHYS/QHP OP CAR RHAB W/ECG: CPT

## 2023-09-28 ENCOUNTER — APPOINTMENT (OUTPATIENT)
Dept: CARDIAC REHAB | Age: 48
End: 2023-09-28
Payer: COMMERCIAL

## 2023-10-02 ENCOUNTER — HOSPITAL ENCOUNTER (OUTPATIENT)
Dept: CARDIAC REHAB | Age: 48
Setting detail: THERAPIES SERIES
Discharge: HOME | End: 2023-10-02
Payer: COMMERCIAL

## 2023-10-02 PROCEDURE — 93798 PHYS/QHP OP CAR RHAB W/ECG: CPT

## 2023-10-04 ENCOUNTER — HOSPITAL ENCOUNTER (OUTPATIENT)
Dept: CARDIAC REHAB | Age: 48
Setting detail: THERAPIES SERIES
Discharge: HOME | End: 2023-10-04
Payer: COMMERCIAL

## 2023-10-04 PROCEDURE — 93798 PHYS/QHP OP CAR RHAB W/ECG: CPT

## 2023-10-05 ENCOUNTER — HOSPITAL ENCOUNTER (OUTPATIENT)
Dept: CARDIAC REHAB | Age: 48
Setting detail: THERAPIES SERIES
Discharge: HOME | End: 2023-10-05
Payer: COMMERCIAL

## 2023-10-05 PROCEDURE — 93798 PHYS/QHP OP CAR RHAB W/ECG: CPT

## 2023-10-09 ENCOUNTER — HOSPITAL ENCOUNTER (OUTPATIENT)
Dept: CARDIAC REHAB | Age: 48
Setting detail: THERAPIES SERIES
Discharge: HOME | End: 2023-10-09
Payer: COMMERCIAL

## 2023-10-09 PROCEDURE — 93798 PHYS/QHP OP CAR RHAB W/ECG: CPT

## 2023-10-11 ENCOUNTER — APPOINTMENT (OUTPATIENT)
Dept: CARDIAC REHAB | Age: 48
End: 2023-10-11
Payer: COMMERCIAL

## 2023-10-12 ENCOUNTER — HOSPITAL ENCOUNTER (OUTPATIENT)
Dept: CARDIAC REHAB | Age: 48
Setting detail: THERAPIES SERIES
Discharge: HOME | End: 2023-10-12
Payer: COMMERCIAL

## 2023-10-12 PROCEDURE — 93798 PHYS/QHP OP CAR RHAB W/ECG: CPT

## 2023-10-16 ENCOUNTER — HOSPITAL ENCOUNTER (OUTPATIENT)
Dept: CARDIAC REHAB | Age: 48
Setting detail: THERAPIES SERIES
Discharge: HOME | End: 2023-10-16
Payer: COMMERCIAL

## 2023-10-16 PROCEDURE — 93798 PHYS/QHP OP CAR RHAB W/ECG: CPT

## 2023-10-18 ENCOUNTER — HOSPITAL ENCOUNTER (OUTPATIENT)
Dept: CARDIAC REHAB | Age: 48
Setting detail: THERAPIES SERIES
Discharge: HOME | End: 2023-10-18
Payer: COMMERCIAL

## 2023-10-18 PROCEDURE — 93798 PHYS/QHP OP CAR RHAB W/ECG: CPT

## 2023-10-19 ENCOUNTER — HOSPITAL ENCOUNTER (OUTPATIENT)
Age: 48
Discharge: HOME | End: 2023-10-19
Payer: COMMERCIAL

## 2023-10-19 ENCOUNTER — APPOINTMENT (OUTPATIENT)
Dept: CARDIAC REHAB | Age: 48
End: 2023-10-19
Payer: COMMERCIAL

## 2023-10-19 LAB
ALBUMIN SERPL-MCNC: 5 G/DL (ref 3.5–5.2)
ALBUMIN/GLOB SERPL: 1.9 {RATIO} (ref 1–2.5)
ALP SERPL-CCNC: 114 U/L (ref 40–129)
ALT SERPL-CCNC: 39 U/L (ref 5–41)
ANION GAP SERPL CALCULATED.3IONS-SCNC: 14 MMOL/L (ref 9–17)
AST SERPL-CCNC: 24 U/L
BILIRUB SERPL-MCNC: 0.6 MG/DL (ref 0.3–1.2)
BUN SERPL-MCNC: 15 MG/DL (ref 6–20)
BUN/CREAT SERPL: 19 (ref 9–20)
CALCIUM SERPL-MCNC: 10 MG/DL (ref 8.6–10.4)
CHLORIDE SERPL-SCNC: 104 MMOL/L (ref 98–107)
CHOLEST SERPL-MCNC: 154 MG/DL
CHOLESTEROL/HDL RATIO: 3.8
CO2 SERPL-SCNC: 24 MMOL/L (ref 20–31)
CREAT SERPL-MCNC: 0.8 MG/DL (ref 0.7–1.2)
GFR SERPL CREATININE-BSD FRML MDRD: >60 ML/MIN/1.73M2
GLUCOSE SERPL-MCNC: 112 MG/DL (ref 70–99)
HDLC SERPL-MCNC: 41 MG/DL
LDLC SERPL CALC-MCNC: 65 MG/DL (ref 0–130)
POTASSIUM SERPL-SCNC: 3.7 MMOL/L (ref 3.7–5.3)
PROT SERPL-MCNC: 7.7 G/DL (ref 6.4–8.3)
SODIUM SERPL-SCNC: 142 MMOL/L (ref 135–144)
TRIGL SERPL-MCNC: 241 MG/DL
URATE SERPL-MCNC: 9.1 MG/DL (ref 3.4–7)

## 2023-10-19 PROCEDURE — 36415 COLL VENOUS BLD VENIPUNCTURE: CPT

## 2023-10-19 PROCEDURE — 84550 ASSAY OF BLOOD/URIC ACID: CPT

## 2023-10-19 PROCEDURE — 80053 COMPREHEN METABOLIC PANEL: CPT

## 2023-10-19 PROCEDURE — 80061 LIPID PANEL: CPT

## 2023-10-23 ENCOUNTER — APPOINTMENT (OUTPATIENT)
Dept: CARDIAC REHAB | Age: 48
End: 2023-10-23
Payer: COMMERCIAL

## 2023-10-25 ENCOUNTER — HOSPITAL ENCOUNTER (OUTPATIENT)
Dept: CARDIAC REHAB | Age: 48
Setting detail: THERAPIES SERIES
Discharge: HOME | End: 2023-10-25
Payer: COMMERCIAL

## 2023-10-25 PROCEDURE — 93798 PHYS/QHP OP CAR RHAB W/ECG: CPT

## 2023-10-26 ENCOUNTER — APPOINTMENT (OUTPATIENT)
Dept: CARDIAC REHAB | Age: 48
End: 2023-10-26
Payer: COMMERCIAL

## 2023-10-30 ENCOUNTER — APPOINTMENT (OUTPATIENT)
Dept: CARDIAC REHAB | Age: 48
End: 2023-10-30
Payer: COMMERCIAL

## 2023-11-01 ENCOUNTER — HOSPITAL ENCOUNTER (OUTPATIENT)
Dept: CARDIAC REHAB | Age: 48
Setting detail: THERAPIES SERIES
Discharge: HOME | End: 2023-11-01
Payer: COMMERCIAL

## 2023-11-01 PROCEDURE — 93798 PHYS/QHP OP CAR RHAB W/ECG: CPT

## 2023-11-02 ENCOUNTER — HOSPITAL ENCOUNTER (OUTPATIENT)
Dept: NUTRITION | Age: 48
Discharge: HOME | End: 2023-11-02

## 2023-11-02 ENCOUNTER — HOSPITAL ENCOUNTER (OUTPATIENT)
Dept: CARDIAC REHAB | Age: 48
Setting detail: THERAPIES SERIES
Discharge: HOME | End: 2023-11-02
Payer: COMMERCIAL

## 2023-11-02 PROCEDURE — 93798 PHYS/QHP OP CAR RHAB W/ECG: CPT

## 2023-11-02 NOTE — PROGRESS NOTES
MEDICAL NUTRITION THERAPY - CARDIOPULMONARY 1:1 VISIT    Client made appointment to visit 1:1 this date, and reports he has no appetite since his CABG surgery 6/28/23. Reports of having gout since surgery, but drinks very little water, drinks ~12 cups of coffee with creamer daily and ~ 4 cherry Pepsi's daily. States he is trying to wean the Pepsi back, I suggested using smaller cans in wean process. Discussed the importance to increase water and decrease coffee and pop to improve gout. States he cannot have fish because of gout, encouraged water to aid Sx. He skips breakfast most days. Typically does not eat until lunch. He has 1-2 scrambled egg burrito's with cheese and salsa. Dinner is meat, potato, and vegetable. He does meal preparations at home, Pt wife works 3rd shift. . Hx COPD, CHF, and HTN. Recommendations include: Increase whole grains (choose instead of refined wheat products)      Increase whole fruit and vegetable use (at least 5 a day combined)      Eat, instead of drinking fruit items. Incorporate daily activity (>30 minutes)      Read food labels for Total fat (58 < grams daily), Saturated (<12 grams daily), and Trans fats (zero daily)      Read food labels for Sodium content (goal is <2000 mg daily)      Measure food portions and use MyPlate as guide for meal variety      Maintain consistent carbohydrate intakes at meals and snacks (60 grams at meals and 15 grams for a night snack)      Drink more water      Reduce and eliminate cherry Pepsi                                                 Reduce amount of coffee with creamer consumed    Provided literature on Heart Healthy Eating:[facility] [place] heart health, food label guide.      Electronically signed by Vianey Ortiz RD, LD on 11/2/2023 at 8:59 AM

## 2023-11-05 PROCEDURE — 93798 PHYS/QHP OP CAR RHAB W/ECG: CPT

## 2023-11-06 ENCOUNTER — HOSPITAL ENCOUNTER (OUTPATIENT)
Dept: CARDIAC REHAB | Age: 48
Setting detail: THERAPIES SERIES
Discharge: HOME | End: 2023-11-06
Payer: COMMERCIAL

## 2023-11-08 ENCOUNTER — APPOINTMENT (OUTPATIENT)
Dept: CARDIAC REHAB | Age: 48
End: 2023-11-08
Payer: COMMERCIAL

## 2023-11-09 ENCOUNTER — HOSPITAL ENCOUNTER (OUTPATIENT)
Dept: CARDIAC REHAB | Age: 48
Setting detail: THERAPIES SERIES
Discharge: HOME | End: 2023-11-09
Payer: COMMERCIAL

## 2023-11-09 PROCEDURE — 93798 PHYS/QHP OP CAR RHAB W/ECG: CPT

## 2023-11-13 ENCOUNTER — APPOINTMENT (OUTPATIENT)
Dept: CARDIAC REHAB | Age: 48
End: 2023-11-13
Payer: COMMERCIAL

## 2023-11-15 ENCOUNTER — APPOINTMENT (OUTPATIENT)
Dept: CARDIAC REHAB | Age: 48
End: 2023-11-15
Payer: COMMERCIAL

## 2023-11-16 ENCOUNTER — APPOINTMENT (OUTPATIENT)
Dept: CARDIAC REHAB | Age: 48
End: 2023-11-16
Payer: COMMERCIAL

## 2023-11-19 PROCEDURE — 93798 PHYS/QHP OP CAR RHAB W/ECG: CPT

## 2023-11-20 ENCOUNTER — HOSPITAL ENCOUNTER (OUTPATIENT)
Dept: CARDIAC REHAB | Age: 48
Setting detail: THERAPIES SERIES
Discharge: HOME | End: 2023-11-20
Payer: COMMERCIAL

## 2023-11-21 ENCOUNTER — OFFICE VISIT (OUTPATIENT)
Dept: CARDIOLOGY | Age: 48
End: 2023-11-21
Payer: COMMERCIAL

## 2023-11-21 ENCOUNTER — HOSPITAL ENCOUNTER (OUTPATIENT)
Age: 48
Discharge: HOME | End: 2023-11-23
Payer: COMMERCIAL

## 2023-11-21 ENCOUNTER — HOSPITAL ENCOUNTER (OUTPATIENT)
Age: 48
Discharge: HOME | End: 2023-11-21
Payer: COMMERCIAL

## 2023-11-21 VITALS
OXYGEN SATURATION: 98 % | SYSTOLIC BLOOD PRESSURE: 153 MMHG | RESPIRATION RATE: 18 BRPM | WEIGHT: 252 LBS | BODY MASS INDEX: 33.4 KG/M2 | DIASTOLIC BLOOD PRESSURE: 89 MMHG | HEIGHT: 73 IN | HEART RATE: 97 BPM

## 2023-11-21 VITALS
HEIGHT: 73 IN | WEIGHT: 251.99 LBS | SYSTOLIC BLOOD PRESSURE: 153 MMHG | BODY MASS INDEX: 33.4 KG/M2 | DIASTOLIC BLOOD PRESSURE: 89 MMHG

## 2023-11-21 DIAGNOSIS — Z95.1 S/P CABG X 3: ICD-10-CM

## 2023-11-21 DIAGNOSIS — I10 ESSENTIAL HYPERTENSION: ICD-10-CM

## 2023-11-21 DIAGNOSIS — Z91.199 NONCOMPLIANCE: ICD-10-CM

## 2023-11-21 DIAGNOSIS — R94.31 ABNORMAL EKG: ICD-10-CM

## 2023-11-21 DIAGNOSIS — E78.2 MIXED HYPERLIPIDEMIA: ICD-10-CM

## 2023-11-21 DIAGNOSIS — I25.10 ASHD (ARTERIOSCLEROTIC HEART DISEASE): ICD-10-CM

## 2023-11-21 DIAGNOSIS — I25.5 ISCHEMIC CARDIOMYOPATHY: ICD-10-CM

## 2023-11-21 DIAGNOSIS — M1A.0690 CHRONIC GOUT OF KNEE, UNSPECIFIED CAUSE, UNSPECIFIED LATERALITY: ICD-10-CM

## 2023-11-21 DIAGNOSIS — R07.89 BURNING CHEST PAIN: ICD-10-CM

## 2023-11-21 DIAGNOSIS — M1A.0690 CHRONIC GOUT OF KNEE, UNSPECIFIED CAUSE, UNSPECIFIED LATERALITY: Primary | ICD-10-CM

## 2023-11-21 LAB
CHOLEST SERPL-MCNC: 217 MG/DL
CHOLESTEROL/HDL RATIO: 5.7
ECHO AO ROOT DIAM: 4 CM
ECHO AO ROOT INDEX: 1.69 CM/M2
ECHO AV CUSP MM: 1.8 CM
ECHO BSA: 2.43 M2
ECHO LA DIAMETER INDEX: 1.43 CM/M2
ECHO LA DIAMETER: 3.4 CM
ECHO LA MAJOR AXIS: 5.1 CM
ECHO LA TO AORTIC ROOT RATIO: 0.85
ECHO LA VOL BP: 31 ML (ref 18–58)
ECHO LA VOL MOD A2C: 33 ML (ref 18–58)
ECHO LA VOL MOD A4C: 29 ML (ref 18–58)
ECHO LA VOL/BSA BIPLANE: 13 ML/M2 (ref 16–34)
ECHO LA VOLUME AREA LENGTH: 32 ML
ECHO LA VOLUME INDEX AREA LENGTH: 14 ML/M2 (ref 16–34)
ECHO LA VOLUME INDEX MOD A2C: 14 ML/M2 (ref 16–34)
ECHO LA VOLUME INDEX MOD A4C: 12 ML/M2 (ref 16–34)
ECHO LV EDV A2C: 83 ML
ECHO LV EDV A4C: 109 ML
ECHO LV EDV BP: 99 ML (ref 67–155)
ECHO LV EDV INDEX A4C: 46 ML/M2
ECHO LV EDV INDEX BP: 42 ML/M2
ECHO LV EDV NDEX A2C: 35 ML/M2
ECHO LV EJECTION FRACTION BIPLANE: 49 % (ref 55–100)
ECHO LV ESV A2C: 47 ML
ECHO LV ESV A4C: 53 ML
ECHO LV ESV BP: 50 ML (ref 22–58)
ECHO LV ESV INDEX A2C: 20 ML/M2
ECHO LV ESV INDEX A4C: 22 ML/M2
ECHO LV ESV INDEX BP: 21 ML/M2
ECHO LV FRACTIONAL SHORTENING: 21 % (ref 28–44)
ECHO LV INTERNAL DIMENSION DIASTOLE INDEX: 2.03 CM/M2
ECHO LV INTERNAL DIMENSION DIASTOLIC: 4.8 CM (ref 4.2–5.9)
ECHO LV INTERNAL DIMENSION SYSTOLIC INDEX: 1.6 CM/M2
ECHO LV INTERNAL DIMENSION SYSTOLIC: 3.8 CM
ECHO LV IVSD: 1.3 CM (ref 0.6–1)
ECHO LV IVSS: 1.7 CM
ECHO LV MASS 2D: 232.2 G (ref 88–224)
ECHO LV MASS INDEX 2D: 98 G/M2 (ref 49–115)
ECHO LV POSTERIOR WALL DIASTOLIC: 1.2 CM (ref 0.6–1)
ECHO LV POSTERIOR WALL SYSTOLIC: 1.4 CM
ECHO LV RELATIVE WALL THICKNESS RATIO: 0.5
HDLC SERPL-MCNC: 38 MG/DL
LDLC SERPL CALC-MCNC: ABNORMAL MG/DL (ref 0–130)
TRIGL SERPL-MCNC: 443 MG/DL
TROPONIN I SERPL HS-MCNC: 11 NG/L (ref 0–22)

## 2023-11-21 PROCEDURE — 99214 OFFICE O/P EST MOD 30 MIN: CPT | Performed by: INTERNAL MEDICINE

## 2023-11-21 PROCEDURE — 83721 ASSAY OF BLOOD LIPOPROTEIN: CPT

## 2023-11-21 PROCEDURE — 3077F SYST BP >= 140 MM HG: CPT | Performed by: INTERNAL MEDICINE

## 2023-11-21 PROCEDURE — 93308 TTE F-UP OR LMTD: CPT

## 2023-11-21 PROCEDURE — 36415 COLL VENOUS BLD VENIPUNCTURE: CPT

## 2023-11-21 PROCEDURE — 84484 ASSAY OF TROPONIN QUANT: CPT

## 2023-11-21 PROCEDURE — 3079F DIAST BP 80-89 MM HG: CPT | Performed by: INTERNAL MEDICINE

## 2023-11-21 PROCEDURE — 80061 LIPID PANEL: CPT

## 2023-11-21 PROCEDURE — 93000 ELECTROCARDIOGRAM COMPLETE: CPT | Performed by: INTERNAL MEDICINE

## 2023-11-21 RX ORDER — COLCHICINE 0.6 MG/1
TABLET ORAL
COMMUNITY
Start: 2023-09-28

## 2023-11-21 NOTE — PATIENT INSTRUCTIONS
SURVEY:    You may be receiving a survey from [employer] regarding your visit today. Please complete the survey to enable us to provide the highest quality of care to you and your family. If you cannot score us a very good on any question, please call the office to discuss how we could have made your experience a very good one. Thank you.

## 2023-11-22 ENCOUNTER — TELEPHONE (OUTPATIENT)
Dept: CARDIOLOGY | Age: 48
End: 2023-11-22

## 2023-11-22 ENCOUNTER — APPOINTMENT (OUTPATIENT)
Dept: CARDIAC REHAB | Age: 48
End: 2023-11-22
Payer: COMMERCIAL

## 2023-11-22 LAB — LDLC SERPL DIRECT ASSAY-MCNC: 121 MG/DL

## 2023-11-22 NOTE — TELEPHONE ENCOUNTER
----- Message from Genoveva Alexandre MD sent at 11/22/2023  7:27 AM EST -----  Cholesterol is markedly uncontrolled as expected from discontinuation of the medicine. Please continue Zetia and restart lower dose atorvastatin 40 mg daily. I will send a new prescription to the pharmacy.   Thank you

## 2023-11-23 ENCOUNTER — APPOINTMENT (OUTPATIENT)
Dept: CARDIAC REHAB | Age: 48
End: 2023-11-23
Payer: COMMERCIAL

## 2023-11-27 ENCOUNTER — HOSPITAL ENCOUNTER (OUTPATIENT)
Dept: CARDIAC REHAB | Age: 48
Setting detail: THERAPIES SERIES
Discharge: HOME | End: 2023-11-27
Payer: COMMERCIAL

## 2023-11-27 PROCEDURE — 93798 PHYS/QHP OP CAR RHAB W/ECG: CPT

## 2023-11-29 ENCOUNTER — HOSPITAL ENCOUNTER (OUTPATIENT)
Dept: CARDIAC REHAB | Age: 48
Setting detail: THERAPIES SERIES
Discharge: HOME | End: 2023-11-29
Payer: COMMERCIAL

## 2023-11-29 PROCEDURE — 93798 PHYS/QHP OP CAR RHAB W/ECG: CPT

## 2023-11-30 ENCOUNTER — HOSPITAL ENCOUNTER (OUTPATIENT)
Dept: CARDIAC REHAB | Age: 48
Setting detail: THERAPIES SERIES
Discharge: HOME | End: 2023-11-30
Payer: COMMERCIAL

## 2023-11-30 ENCOUNTER — TELEPHONE (OUTPATIENT)
Dept: CARDIAC REHAB | Age: 48
End: 2023-11-30

## 2023-11-30 PROCEDURE — 93798 PHYS/QHP OP CAR RHAB W/ECG: CPT

## 2023-11-30 RX ORDER — BUPROPION HYDROCHLORIDE 150 MG/1
150 TABLET ORAL EVERY MORNING
Qty: 30 TABLET | Refills: 0 | Status: SHIPPED | OUTPATIENT
Start: 2023-11-30

## 2023-11-30 NOTE — TELEPHONE ENCOUNTER
Dr. 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 He relates that he has resumed smoking 2-3 cigarettes/day. We discussed NRT today, and he is agreeable to trying Wellbutrin if you agree.   Thank you,  Francoise William

## 2024-01-02 ENCOUNTER — TELEPHONE (OUTPATIENT)
Dept: CARDIAC REHAB | Age: 49
End: 2024-01-02

## 2024-01-02 ENCOUNTER — HOSPITAL ENCOUNTER (OUTPATIENT)
Dept: CARDIAC REHAB | Age: 49
Setting detail: THERAPIES SERIES
Discharge: HOME | End: 2024-01-02

## 2024-01-02 NOTE — TELEPHONE ENCOUNTER
Called patient twice and sent letter once via mail due to absence from cardiac rehab. No response from patient for over two weeks. He did complete 27 of 36 sessions. Therefore, his information will be filed unless he contacts rehab wishing to return. Thank you for the referral!

## 2024-02-27 DIAGNOSIS — I25.10 ASHD (ARTERIOSCLEROTIC HEART DISEASE): Primary | ICD-10-CM

## 2024-02-27 DIAGNOSIS — R07.89 BURNING CHEST PAIN: ICD-10-CM

## 2024-02-27 DIAGNOSIS — R94.31 ABNORMAL EKG: ICD-10-CM

## 2024-02-27 DIAGNOSIS — Z95.1 S/P CABG X 3: ICD-10-CM

## 2024-02-27 DIAGNOSIS — E78.2 MIXED HYPERLIPIDEMIA: ICD-10-CM

## 2024-02-27 DIAGNOSIS — I10 ESSENTIAL HYPERTENSION: ICD-10-CM

## 2024-02-27 DIAGNOSIS — I25.5 ISCHEMIC CARDIOMYOPATHY: ICD-10-CM

## 2024-02-27 RX ORDER — CLOPIDOGREL BISULFATE 75 MG/1
75 TABLET ORAL DAILY
Qty: 90 TABLET | Refills: 3 | Status: SHIPPED | OUTPATIENT
Start: 2024-02-27

## 2024-02-27 RX ORDER — EZETIMIBE 10 MG/1
10 TABLET ORAL DAILY
Qty: 90 TABLET | Refills: 3 | Status: SHIPPED | OUTPATIENT
Start: 2024-02-27

## 2024-03-07 ENCOUNTER — OFFICE VISIT (OUTPATIENT)
Dept: CARDIOLOGY | Age: 49
End: 2024-03-07

## 2024-03-07 ENCOUNTER — HOSPITAL ENCOUNTER (OUTPATIENT)
Age: 49
Discharge: HOME | End: 2024-03-07
Attending: EMERGENCY MEDICINE | Admitting: FAMILY MEDICINE
Payer: COMMERCIAL

## 2024-03-07 ENCOUNTER — APPOINTMENT (OUTPATIENT)
Dept: GENERAL RADIOLOGY | Age: 49
End: 2024-03-07
Payer: COMMERCIAL

## 2024-03-07 VITALS
DIASTOLIC BLOOD PRESSURE: 77 MMHG | OXYGEN SATURATION: 97 % | TEMPERATURE: 98.2 F | HEART RATE: 78 BPM | RESPIRATION RATE: 15 BRPM | SYSTOLIC BLOOD PRESSURE: 131 MMHG

## 2024-03-07 VITALS
HEIGHT: 72 IN | BODY MASS INDEX: 35.21 KG/M2 | HEART RATE: 80 BPM | SYSTOLIC BLOOD PRESSURE: 132 MMHG | DIASTOLIC BLOOD PRESSURE: 76 MMHG | RESPIRATION RATE: 18 BRPM | WEIGHT: 260 LBS | OXYGEN SATURATION: 96 %

## 2024-03-07 DIAGNOSIS — E78.2 MIXED HYPERLIPIDEMIA: ICD-10-CM

## 2024-03-07 DIAGNOSIS — R07.9 CHEST PAIN: ICD-10-CM

## 2024-03-07 DIAGNOSIS — R06.02 SOB (SHORTNESS OF BREATH): ICD-10-CM

## 2024-03-07 DIAGNOSIS — R94.31 ABNORMAL EKG: ICD-10-CM

## 2024-03-07 DIAGNOSIS — I20.0 UNSTABLE ANGINA (HCC): Primary | ICD-10-CM

## 2024-03-07 DIAGNOSIS — I10 ESSENTIAL HYPERTENSION: ICD-10-CM

## 2024-03-07 DIAGNOSIS — I25.5 ISCHEMIC CARDIOMYOPATHY: ICD-10-CM

## 2024-03-07 DIAGNOSIS — Z95.1 S/P CABG X 3: ICD-10-CM

## 2024-03-07 DIAGNOSIS — R06.02 SOB (SHORTNESS OF BREATH): Primary | ICD-10-CM

## 2024-03-07 DIAGNOSIS — R07.89 BURNING CHEST PAIN: ICD-10-CM

## 2024-03-07 DIAGNOSIS — R07.9 CHEST PAIN, UNSPECIFIED TYPE: Primary | ICD-10-CM

## 2024-03-07 DIAGNOSIS — I25.10 ASHD (ARTERIOSCLEROTIC HEART DISEASE): ICD-10-CM

## 2024-03-07 DIAGNOSIS — R42 LIGHTHEADED: ICD-10-CM

## 2024-03-07 LAB
ANION GAP SERPL CALCULATED.3IONS-SCNC: 16 MMOL/L (ref 9–17)
BASOPHILS # BLD: 0.11 K/UL (ref 0–0.2)
BASOPHILS NFR BLD: 1 % (ref 0–2)
BUN SERPL-MCNC: 18 MG/DL (ref 6–20)
BUN/CREAT SERPL: 23 (ref 9–20)
CALCIUM SERPL-MCNC: 9.4 MG/DL (ref 8.6–10.4)
CHLORIDE SERPL-SCNC: 97 MMOL/L (ref 98–107)
CO2 SERPL-SCNC: 24 MMOL/L (ref 20–31)
CREAT SERPL-MCNC: 0.8 MG/DL (ref 0.7–1.2)
EKG ATRIAL RATE: 67 BPM
EKG P AXIS: 36 DEGREES
EKG P-R INTERVAL: 184 MS
EKG Q-T INTERVAL: 440 MS
EKG QRS DURATION: 104 MS
EKG QTC CALCULATION (BAZETT): 464 MS
EKG R AXIS: 3 DEGREES
EKG T AXIS: 122 DEGREES
EKG VENTRICULAR RATE: 67 BPM
EOSINOPHIL # BLD: 0.19 K/UL (ref 0–0.44)
EOSINOPHILS RELATIVE PERCENT: 2 % (ref 1–4)
ERYTHROCYTE [DISTWIDTH] IN BLOOD BY AUTOMATED COUNT: 12.2 % (ref 11.8–14.4)
GFR SERPL CREATININE-BSD FRML MDRD: >60 ML/MIN/1.73M2
GLUCOSE SERPL-MCNC: 98 MG/DL (ref 70–99)
HCT VFR BLD AUTO: 43.3 % (ref 40.7–50.3)
HGB BLD-MCNC: 14.7 G/DL (ref 13–17)
IMM GRANULOCYTES # BLD AUTO: 0.03 K/UL (ref 0–0.3)
IMM GRANULOCYTES NFR BLD: 0 %
LYMPHOCYTES NFR BLD: 1.76 K/UL (ref 1.1–3.7)
LYMPHOCYTES RELATIVE PERCENT: 18 % (ref 24–43)
MCH RBC QN AUTO: 30.2 PG (ref 25.2–33.5)
MCHC RBC AUTO-ENTMCNC: 33.9 G/DL (ref 28.4–34.8)
MCV RBC AUTO: 88.9 FL (ref 82.6–102.9)
MONOCYTES NFR BLD: 1.02 K/UL (ref 0.1–1.2)
MONOCYTES NFR BLD: 11 % (ref 3–12)
NEUTROPHILS NFR BLD: 68 % (ref 36–65)
NEUTS SEG NFR BLD: 6.62 K/UL (ref 1.5–8.1)
NRBC BLD-RTO: 0 PER 100 WBC
PLATELET # BLD AUTO: 253 K/UL (ref 138–453)
PMV BLD AUTO: 10.4 FL (ref 8.1–13.5)
POTASSIUM SERPL-SCNC: 3.6 MMOL/L (ref 3.7–5.3)
RBC # BLD AUTO: 4.87 M/UL (ref 4.21–5.77)
SODIUM SERPL-SCNC: 137 MMOL/L (ref 135–144)
TROPONIN I SERPL HS-MCNC: 10 NG/L (ref 0–22)
WBC OTHER # BLD: 9.7 K/UL (ref 3.5–11.3)

## 2024-03-07 PROCEDURE — 93459 L HRT ART/GRFT ANGIO: CPT | Performed by: INTERNAL MEDICINE

## 2024-03-07 PROCEDURE — 99153 MOD SED SAME PHYS/QHP EA: CPT | Performed by: FAMILY MEDICINE

## 2024-03-07 PROCEDURE — 2709999900 HC NON-CHARGEABLE SUPPLY: Performed by: FAMILY MEDICINE

## 2024-03-07 PROCEDURE — 6360000002 HC RX W HCPCS: Performed by: INTERNAL MEDICINE

## 2024-03-07 PROCEDURE — 93005 ELECTROCARDIOGRAM TRACING: CPT | Performed by: EMERGENCY MEDICINE

## 2024-03-07 PROCEDURE — 93010 ELECTROCARDIOGRAM REPORT: CPT | Performed by: INTERNAL MEDICINE

## 2024-03-07 PROCEDURE — 80048 BASIC METABOLIC PNL TOTAL CA: CPT

## 2024-03-07 PROCEDURE — 2580000003 HC RX 258: Performed by: EMERGENCY MEDICINE

## 2024-03-07 PROCEDURE — C1894 INTRO/SHEATH, NON-LASER: HCPCS | Performed by: FAMILY MEDICINE

## 2024-03-07 PROCEDURE — 6360000004 HC RX CONTRAST MEDICATION: Performed by: FAMILY MEDICINE

## 2024-03-07 PROCEDURE — 93459 L HRT ART/GRFT ANGIO: CPT | Performed by: FAMILY MEDICINE

## 2024-03-07 PROCEDURE — 99152 MOD SED SAME PHYS/QHP 5/>YRS: CPT | Performed by: FAMILY MEDICINE

## 2024-03-07 PROCEDURE — 2500000003 HC RX 250 WO HCPCS: Performed by: INTERNAL MEDICINE

## 2024-03-07 PROCEDURE — 7100000010 HC PHASE II RECOVERY - FIRST 15 MIN: Performed by: FAMILY MEDICINE

## 2024-03-07 PROCEDURE — 84484 ASSAY OF TROPONIN QUANT: CPT

## 2024-03-07 PROCEDURE — 7100000011 HC PHASE II RECOVERY - ADDTL 15 MIN: Performed by: FAMILY MEDICINE

## 2024-03-07 PROCEDURE — C1769 GUIDE WIRE: HCPCS | Performed by: FAMILY MEDICINE

## 2024-03-07 PROCEDURE — 6360000002 HC RX W HCPCS: Performed by: FAMILY MEDICINE

## 2024-03-07 PROCEDURE — 85025 COMPLETE CBC W/AUTO DIFF WBC: CPT

## 2024-03-07 PROCEDURE — 99285 EMERGENCY DEPT VISIT HI MDM: CPT

## 2024-03-07 PROCEDURE — 71045 X-RAY EXAM CHEST 1 VIEW: CPT

## 2024-03-07 RX ORDER — SODIUM CHLORIDE 0.9 % (FLUSH) 0.9 %
5-40 SYRINGE (ML) INJECTION EVERY 12 HOURS SCHEDULED
Status: DISCONTINUED | OUTPATIENT
Start: 2024-03-07 | End: 2024-03-07 | Stop reason: HOSPADM

## 2024-03-07 RX ORDER — EZETIMIBE 10 MG/1
10 TABLET ORAL DAILY
Qty: 90 TABLET | Refills: 3 | Status: SHIPPED | OUTPATIENT
Start: 2024-03-07

## 2024-03-07 RX ORDER — DIPHENHYDRAMINE HCL 50 MG
50 CAPSULE ORAL ONCE
Status: DISCONTINUED | OUTPATIENT
Start: 2024-03-07 | End: 2024-03-07 | Stop reason: HOSPADM

## 2024-03-07 RX ORDER — NITROGLYCERIN 0.4 MG/1
0.4 TABLET SUBLINGUAL EVERY 5 MIN PRN
Status: DISCONTINUED | OUTPATIENT
Start: 2024-03-07 | End: 2024-03-07 | Stop reason: HOSPADM

## 2024-03-07 RX ORDER — ISOSORBIDE MONONITRATE 30 MG/1
30 TABLET, EXTENDED RELEASE ORAL DAILY
Qty: 90 TABLET | Refills: 3 | Status: SHIPPED | OUTPATIENT
Start: 2024-03-07

## 2024-03-07 RX ORDER — SODIUM CHLORIDE 9 MG/ML
INJECTION, SOLUTION INTRAVENOUS PRN
Status: DISCONTINUED | OUTPATIENT
Start: 2024-03-07 | End: 2024-03-07 | Stop reason: HOSPADM

## 2024-03-07 RX ORDER — ACETAMINOPHEN 325 MG/1
650 TABLET ORAL EVERY 4 HOURS PRN
Status: DISCONTINUED | OUTPATIENT
Start: 2024-03-07 | End: 2024-03-07 | Stop reason: HOSPADM

## 2024-03-07 RX ORDER — NITROGLYCERIN 20 MG/100ML
INJECTION INTRAVENOUS PRN
Status: DISCONTINUED | OUTPATIENT
Start: 2024-03-07 | End: 2024-03-07 | Stop reason: HOSPADM

## 2024-03-07 RX ORDER — SODIUM CHLORIDE 0.9 % (FLUSH) 0.9 %
5-40 SYRINGE (ML) INJECTION PRN
Status: DISCONTINUED | OUTPATIENT
Start: 2024-03-07 | End: 2024-03-07 | Stop reason: HOSPADM

## 2024-03-07 RX ORDER — LIDOCAINE HYDROCHLORIDE 10 MG/ML
INJECTION, SOLUTION INFILTRATION; PERINEURAL PRN
Status: DISCONTINUED | OUTPATIENT
Start: 2024-03-07 | End: 2024-03-07 | Stop reason: HOSPADM

## 2024-03-07 RX ORDER — HEPARIN SODIUM 1000 [USP'U]/ML
INJECTION, SOLUTION INTRAVENOUS; SUBCUTANEOUS PRN
Status: DISCONTINUED | OUTPATIENT
Start: 2024-03-07 | End: 2024-03-07 | Stop reason: HOSPADM

## 2024-03-07 RX ORDER — MIDAZOLAM HYDROCHLORIDE 1 MG/ML
INJECTION INTRAMUSCULAR; INTRAVENOUS PRN
Status: DISCONTINUED | OUTPATIENT
Start: 2024-03-07 | End: 2024-03-07 | Stop reason: HOSPADM

## 2024-03-07 RX ORDER — CLOPIDOGREL BISULFATE 75 MG/1
75 TABLET ORAL DAILY
Qty: 90 TABLET | Refills: 3 | Status: SHIPPED | OUTPATIENT
Start: 2024-03-07

## 2024-03-07 RX ORDER — SODIUM CHLORIDE 9 MG/ML
INJECTION, SOLUTION INTRAVENOUS CONTINUOUS
Status: DISCONTINUED | OUTPATIENT
Start: 2024-03-07 | End: 2024-03-07 | Stop reason: HOSPADM

## 2024-03-07 RX ADMIN — SODIUM CHLORIDE: 9 INJECTION, SOLUTION INTRAVENOUS at 11:28

## 2024-03-07 ASSESSMENT — PAIN SCALES - GENERAL: PAINLEVEL_OUTOF10: 5

## 2024-03-07 ASSESSMENT — PAIN DESCRIPTION - LOCATION
LOCATION: CHEST
LOCATION: CHEST

## 2024-03-07 ASSESSMENT — HEART SCORE: ECG: 1

## 2024-03-07 ASSESSMENT — PAIN DESCRIPTION - ORIENTATION
ORIENTATION: UPPER;ANTERIOR
ORIENTATION: MID

## 2024-03-07 ASSESSMENT — PAIN DESCRIPTION - FREQUENCY: FREQUENCY: INTERMITTENT

## 2024-03-07 ASSESSMENT — PAIN DESCRIPTION - DESCRIPTORS
DESCRIPTORS: SHARP
DESCRIPTORS: STABBING;SHARP

## 2024-03-07 ASSESSMENT — PAIN - FUNCTIONAL ASSESSMENT
PAIN_FUNCTIONAL_ASSESSMENT: 0-10
PAIN_FUNCTIONAL_ASSESSMENT: PREVENTS OR INTERFERES SOME ACTIVE ACTIVITIES AND ADLS
PAIN_FUNCTIONAL_ASSESSMENT: PREVENTS OR INTERFERES SOME ACTIVE ACTIVITIES AND ADLS

## 2024-03-07 ASSESSMENT — PAIN DESCRIPTION - PAIN TYPE: TYPE: ACUTE PAIN

## 2024-03-07 NOTE — PROGRESS NOTES
11/21/2023    LDLDIRECT 121 (H) 11/21/2023    ALT 39 10/19/2023    AST 24 10/19/2023     10/19/2023    K 3.7 10/19/2023     10/19/2023    CREATININE 0.8 10/19/2023    BUN 15 10/19/2023    CO2 24 10/19/2023    TSH 2.79 06/13/2023    LABA1C 5.1 06/13/2023       ASSESSMENT:     1. Chest pain, unspecified type    2. SOB (shortness of breath)    3. Abnormal EKG    4. Lightheaded    5. ASHD (arteriosclerotic heart disease)    6. S/P CABG x 3    7. Ischemic cardiomyopathy    8. Essential hypertension    9. Mixed hyperlipidemia      PLAN:        Typical Chest Pain: Did review his DARBY Risk Score: Showed 20% risk at 14 days of all cause mortality, new or recurrent MI or severe recurrent ischemia requiring urgent revascularization. Also discussed his Heart Score which showed risk of MACE of 12-16.6%.  We discussed his chest pains and how they have gotten worse since his last visit especially on exertion. He also can become shortness of breath with these episodes. We discussed multiple treatment options at this time including medications and additional testing. We also discussed due to his symptoms taking him to the ER and do blood work and also get a heart cath done today to get a clear answer on his symptoms. In the end he was agreeable to doing this.   Antiplatelet Agent: Continue aspirin 81 mg daily and clopidogrel (Plavix 75 mg daily. I also reminded him to watch for signs of blood in his stool or black tarry stools and stop the medication immediately if this develops as this could be life threatening.   Beta Blocker Therapy: Continue metoprolol tartrate (Lopressor)  25 mg twice daily    Cholesterol Reduction Therapy: Continue Atorvastatin (Lipitor) 80 mg daily.   and ezetimide (Zetia) 10 mg daily.  Additional Testing List: For these reasons, I recommended that the patient consider undergoing a cardiac catheterization with coronary angiography to assess their coronary anatomy and facilitate better treatment

## 2024-03-07 NOTE — PROGRESS NOTES
Discharge instructions reviewed with patient, voices understanding.  Dressed for home, ambulates to main entrance independently.  All belongings sent with patient.

## 2024-03-07 NOTE — ED PROVIDER NOTES
[facility] CARDIAC CATH/IR LAB  EMERGENCY DEPARTMENT ENCOUNTER      Pt Name: Johnathon Ba  MRN: [id number]  Birthdate 1975  Date of evaluation: 3/7/2024  Provider: Trevon Dixon MD  9:38 AM    CHIEF COMPLAINT       Chief Complaint   Patient presents with    Chest Pain         HISTORY OF PRESENT ILLNESS    Johnathon Ba is a 48 y.o. male who presents to the emergency department     48-year-old patient presented to the emergency department under direction of his cardiologist Dr. Wood for laboratory tests and chest x-ray with anticipation of cardiac catheterization.  The patient did have a EKG Dr. Wood's office and is documented in the patient's chart.  The patient does have a history for coronary bypass surgery in the past year patient having the bypass surgery in June 2023 at the [facility].  Patient does endorse a previous history for smoking cigarettes.  Patient had presented to Dr. Wood's office with a history for increasing chest tightness exacerbated with exertion without associated syncopal episodes.  Patient denies any radiation discomfort neck arm jaw or back.  Discomfort currently is 2/10        Nursing Notes were reviewed.    REVIEW OF SYSTEMS       Review of Systems   All other systems reviewed and are negative.      Except as noted above the remainder of the review of systems was reviewed and negative.       PAST MEDICAL HISTORY     Past Medical History:   Diagnosis Date    Angina effort     CAD (coronary artery disease)     Gout     Hypertension     no longer on medication    [facility]         SURGICAL HISTORY       Past Surgical History:   Procedure Laterality Date    CARDIAC CATHETERIZATION Left 03/07/2024    L. radial / Dr. Allen / [facility]    CHOLECYSTECTOMY, LAPAROSCOPIC  07/2012    KNEE ARTHROPLASTY Left 11/02/2020    Dr. Rogers - KNEE ARTHROSCOPY-MEDIAL MENISECTOMY,  DRAINAGE OF CYST LEFT  Repair Type: Intermediate

## 2024-03-07 NOTE — DISCHARGE INSTRUCTIONS
Discharge Instructions for Cardiac Catheterization    A cardiac catheterization is a diagnostic test used to evaluate the health of the heart and its blood vessels. The test is done with a thin catheter carefully threaded into your heart from a leg or arm artery. Most likely, you will be allowed to go home the same day as the procedure.   Steps to Take at Home:   Pain- apply ice to site 15-20 minutes every hour for the first 2 days.   Showering is okay 24 hours after procedure.    No soaking in a pool, hot tub, bath tub, or standing water for one week.   Bleeding (outward or under the skin-hematoma)- apply firm pressure for 10-15 minutes or until the bleeding stops, then call your doctor. If unable to get bleeding stopped, call 911.    Kidney damage- Call if you urinate less than normal, have swelling or feel puffy, and/or gain 2 or more pounds over night in the first week.   If procedure was in ARM:  You were instructed to keep wrist straight and still for two hours after the procedure. The arm and hand may now be used for normal daily activities except, avoid using the heal of hand while getting up and down from furniture for the first few days.  Keep affected arm elevated, hand higher than elbow, while pressure dressing in place to decrease swelling.  Gauze and Elastoplast   Remove in 4 hours as follows:     TIME:____7:45PM___________________  Remove 1 piece of tape at a time, waiting 15 -20 minutes between layers to monitor for bleeding.   If dressing sticks, place wrist under cool running water to help loosen gauze from site then pat site dry.   If hand feels numb, tingly, and/or cold- loosen first 1-2 layers of tape if dressing still in place.  If no relief noticed, remove pressure dressing as per above instructions. Seek medical help if no relief or if dressing already off.     Diet   Drink plenty of fluids after the test to flush the x-ray dye from your system.   Return to your normal diet.   No alcoholic

## 2024-03-07 NOTE — PROGRESS NOTES
Inpatients must meet criteria 1 through 7.   1. Minimum 30 minutes after last dose of sedative medication, minimum 120 minutes after last dose of reversal agent.   Yes   2. Systolic BP stable within 20 mmHg for 30 minutes & systolic BP between 90 & 180 or within 10 mmHg of baseline.   Yes   3. Pulse between 60 and 100 or within 10 bpm of baseline.   Yes   4. Spontaneous respiratory rate >/= 10 per minute.   Yes   5. SaO2 >/= 95 or >/= baseline.   Yes   6. Able to cough and swallow or return to baseline function.   Yes   7. Alert and oriented or return to baseline mental status.   Yes   8. Demonstrates controlled, coordinated movements, ambulates with steady gait, or return to baseline activity function.   Yes   9. Minimal or no pain or nausea, or at a level tolerable and acceptable to patient.   Yes   10. Takes and retains oral fluids as allowed.   Yes   11. Procedural / perioperative site stable. Minimal or no bleeding.   Yes   12. If GI endoscopy procedure, minimal or no abdominal distention or passing flatus.   N/A   13. Written discharge instructions and emergency telephone number provided.   Yes   14. Accompanied by a responsible adult.   Yes   Adult patient discharged from facility without responsible person meets above criteria plus the following:   a) remains awake without stimulus for 30 minutes   b) oriented appropriate for age   c) all vital signs stable   d) no significant risk of losing protective reflexes   e) able to maintain pre-procedure mobility without assistance   f) no nausea or dizziness   g) transportation arrangements that do not require patient to operate motor Vehicle.   N/A

## 2024-03-08 LAB — ECHO BSA: 2.45 M2

## 2024-03-22 ENCOUNTER — OFFICE VISIT (OUTPATIENT)
Dept: CARDIOLOGY | Age: 49
End: 2024-03-22

## 2024-03-22 VITALS
BODY MASS INDEX: 35.78 KG/M2 | DIASTOLIC BLOOD PRESSURE: 69 MMHG | SYSTOLIC BLOOD PRESSURE: 109 MMHG | WEIGHT: 270 LBS | RESPIRATION RATE: 18 BRPM | HEART RATE: 76 BPM | HEIGHT: 73 IN | OXYGEN SATURATION: 98 %

## 2024-03-22 DIAGNOSIS — E66.9 CLASS 2 OBESITY WITH BODY MASS INDEX (BMI) OF 35.0 TO 35.9 IN ADULT, UNSPECIFIED OBESITY TYPE, UNSPECIFIED WHETHER SERIOUS COMORBIDITY PRESENT: ICD-10-CM

## 2024-03-22 DIAGNOSIS — I50.42 CHRONIC COMBINED SYSTOLIC AND DIASTOLIC CONGESTIVE HEART FAILURE (HCC): ICD-10-CM

## 2024-03-22 DIAGNOSIS — G47.33 OSA (OBSTRUCTIVE SLEEP APNEA): ICD-10-CM

## 2024-03-22 DIAGNOSIS — I20.89 CHRONIC STABLE ANGINA: ICD-10-CM

## 2024-03-22 DIAGNOSIS — I25.708 CORONARY ARTERY DISEASE OF BYPASS GRAFT OF NATIVE HEART WITH STABLE ANGINA PECTORIS (HCC): Primary | ICD-10-CM

## 2024-03-22 DIAGNOSIS — I25.5 ISCHEMIC CARDIOMYOPATHY: ICD-10-CM

## 2024-03-22 DIAGNOSIS — Z95.1 S/P CABG X 3: ICD-10-CM

## 2024-03-22 DIAGNOSIS — R53.83 TIREDNESS: ICD-10-CM

## 2024-03-22 DIAGNOSIS — G44.229 CHRONIC TENSION-TYPE HEADACHE, NOT INTRACTABLE: ICD-10-CM

## 2024-03-22 DIAGNOSIS — I10 ESSENTIAL HYPERTENSION: ICD-10-CM

## 2024-03-22 DIAGNOSIS — E78.2 MIXED HYPERLIPIDEMIA: ICD-10-CM

## 2024-03-22 DIAGNOSIS — M1A.0790 CHRONIC GOUT OF FOOT, UNSPECIFIED CAUSE, UNSPECIFIED LATERALITY: ICD-10-CM

## 2024-03-22 RX ORDER — AMLODIPINE BESYLATE 2.5 MG/1
2.5 TABLET ORAL DAILY
Qty: 90 TABLET | Refills: 3 | Status: SHIPPED | OUTPATIENT
Start: 2024-03-22

## 2024-03-22 RX ORDER — RANOLAZINE 500 MG/1
500 TABLET, EXTENDED RELEASE ORAL 2 TIMES DAILY
Qty: 180 TABLET | Refills: 3 | Status: SHIPPED | OUTPATIENT
Start: 2024-03-22

## 2024-03-22 NOTE — PROGRESS NOTES
9/22/2024). However, I would be happy to see him sooner should the need arise.      Sincerely,  Viridiana Wood MD, F.A.C.C.  [facility] Cardiology Specialist    [address] [place] [id number]  Phone: [phone], Fax: [phone]     I believe that the risk of significant morbidity and mortality related to the patient's current medical conditions are: Intermediate. Approximately 40 minutes were spent during prep work, discussion and exam of the patient, and follow up documentation and all of their questions were answered.    The documentation recorded by the scribe, accurately and completely reflects the services I personally performed and the decisions made by me. Viridiana Wood MD, F.A.C.C. March 22, 2024

## 2024-03-29 ENCOUNTER — TELEPHONE (OUTPATIENT)
Dept: CARDIAC REHAB | Age: 49
End: 2024-03-29

## 2024-04-10 DIAGNOSIS — R06.02 SOB (SHORTNESS OF BREATH): ICD-10-CM

## 2024-04-10 DIAGNOSIS — R53.83 TIREDNESS: ICD-10-CM

## 2024-04-10 DIAGNOSIS — Z95.1 S/P CABG X 3: ICD-10-CM

## 2024-04-10 DIAGNOSIS — I20.89 CHRONIC STABLE ANGINA (HCC): ICD-10-CM

## 2024-04-10 DIAGNOSIS — G47.33 OSA (OBSTRUCTIVE SLEEP APNEA): ICD-10-CM

## 2024-04-10 DIAGNOSIS — I25.5 ISCHEMIC CARDIOMYOPATHY: ICD-10-CM

## 2024-04-10 DIAGNOSIS — I25.708 CORONARY ARTERY DISEASE OF BYPASS GRAFT OF NATIVE HEART WITH STABLE ANGINA PECTORIS (HCC): Primary | ICD-10-CM

## 2024-04-16 ENCOUNTER — TELEPHONE (OUTPATIENT)
Dept: CARDIAC REHAB | Age: 49
End: 2024-04-16

## 2024-04-16 NOTE — TELEPHONE ENCOUNTER
The cardiopulmonary rehab staff was unable to contact Johnathon via phone and mail about starting phase 3 cardiac rehab. We will file his information unless he contacts us wishing to start the program. Thank you for the referral!

## 2024-05-19 ENCOUNTER — HOSPITAL ENCOUNTER (EMERGENCY)
Age: 49
Discharge: HOME | End: 2024-05-19
Payer: COMMERCIAL

## 2024-05-19 ENCOUNTER — APPOINTMENT (OUTPATIENT)
Dept: GENERAL RADIOLOGY | Age: 49
End: 2024-05-19
Payer: COMMERCIAL

## 2024-05-19 VITALS
SYSTOLIC BLOOD PRESSURE: 154 MMHG | BODY MASS INDEX: 35.78 KG/M2 | HEIGHT: 73 IN | OXYGEN SATURATION: 97 % | DIASTOLIC BLOOD PRESSURE: 87 MMHG | HEART RATE: 89 BPM | WEIGHT: 270 LBS | RESPIRATION RATE: 20 BRPM

## 2024-05-19 DIAGNOSIS — S62.525B NONDISPLACED FRACTURE OF DISTAL PHALANX OF LEFT THUMB, INITIAL ENCOUNTER FOR OPEN FRACTURE: Primary | ICD-10-CM

## 2024-05-19 DIAGNOSIS — S61.012A THUMB LACERATION, LEFT, INITIAL ENCOUNTER: ICD-10-CM

## 2024-05-19 PROCEDURE — 90715 TDAP VACCINE 7 YRS/> IM: CPT | Performed by: PHYSICIAN ASSISTANT

## 2024-05-19 PROCEDURE — 6360000002 HC RX W HCPCS: Performed by: PHYSICIAN ASSISTANT

## 2024-05-19 PROCEDURE — 73130 X-RAY EXAM OF HAND: CPT

## 2024-05-19 PROCEDURE — 99284 EMERGENCY DEPT VISIT MOD MDM: CPT

## 2024-05-19 PROCEDURE — 90471 IMMUNIZATION ADMIN: CPT | Performed by: PHYSICIAN ASSISTANT

## 2024-05-19 PROCEDURE — 6370000000 HC RX 637 (ALT 250 FOR IP): Performed by: PHYSICIAN ASSISTANT

## 2024-05-19 PROCEDURE — 12042 INTMD RPR N-HF/GENIT2.6-7.5: CPT

## 2024-05-19 RX ORDER — BUPIVACAINE HYDROCHLORIDE 5 MG/ML
30 INJECTION, SOLUTION EPIDURAL; INTRACAUDAL ONCE
Status: COMPLETED | OUTPATIENT
Start: 2024-05-19 | End: 2024-05-19

## 2024-05-19 RX ORDER — OXYCODONE HYDROCHLORIDE AND ACETAMINOPHEN 5; 325 MG/1; MG/1
1 TABLET ORAL ONCE
Status: COMPLETED | OUTPATIENT
Start: 2024-05-19 | End: 2024-05-19

## 2024-05-19 RX ORDER — CEPHALEXIN 500 MG/1
500 CAPSULE ORAL ONCE
Status: COMPLETED | OUTPATIENT
Start: 2024-05-19 | End: 2024-05-19

## 2024-05-19 RX ORDER — OXYCODONE HYDROCHLORIDE AND ACETAMINOPHEN 5; 325 MG/1; MG/1
1 TABLET ORAL EVERY 6 HOURS PRN
Qty: 12 TABLET | Refills: 0 | Status: SHIPPED | OUTPATIENT
Start: 2024-05-19 | End: 2024-05-22

## 2024-05-19 RX ORDER — CEPHALEXIN 500 MG/1
500 CAPSULE ORAL 3 TIMES DAILY
Qty: 21 CAPSULE | Refills: 0 | Status: SHIPPED | OUTPATIENT
Start: 2024-05-19

## 2024-05-19 RX ADMIN — TETANUS TOXOID, REDUCED DIPHTHERIA TOXOID AND ACELLULAR PERTUSSIS VACCINE, ADSORBED 0.5 ML: 5; 2.5; 8; 8; 2.5 SUSPENSION INTRAMUSCULAR at 18:12

## 2024-05-19 RX ADMIN — OXYCODONE HYDROCHLORIDE AND ACETAMINOPHEN 1 TABLET: 5; 325 TABLET ORAL at 18:10

## 2024-05-19 RX ADMIN — CEPHALEXIN 500 MG: 500 CAPSULE ORAL at 18:10

## 2024-05-19 RX ADMIN — BUPIVACAINE HYDROCHLORIDE 150 MG: 5 INJECTION, SOLUTION EPIDURAL; INTRACAUDAL; PERINEURAL at 18:12

## 2024-05-19 ASSESSMENT — PAIN SCALES - GENERAL
PAINLEVEL_OUTOF10: 4
PAINLEVEL_OUTOF10: 10
PAINLEVEL_OUTOF10: 10

## 2024-05-19 ASSESSMENT — PAIN DESCRIPTION - ORIENTATION
ORIENTATION: LEFT
ORIENTATION: LEFT

## 2024-05-19 ASSESSMENT — PAIN DESCRIPTION - LOCATION
LOCATION: FINGER (COMMENT WHICH ONE)
LOCATION: FINGER (COMMENT WHICH ONE)

## 2024-05-19 ASSESSMENT — PAIN - FUNCTIONAL ASSESSMENT: PAIN_FUNCTIONAL_ASSESSMENT: 0-10

## 2024-05-19 ASSESSMENT — PAIN DESCRIPTION - PAIN TYPE: TYPE: ACUTE PAIN

## 2024-05-19 NOTE — DISCHARGE INSTRUCTIONS
Sutures to be removed in about 12 days he should have a wound check in 2 to 4 days with Dr. Rogers call tomorrow to get an appointment.  Pain medication and antibiotic as prescribed.  If able to get into the office in the next 3 days no need to change her dressing until you are seen.

## 2024-05-19 NOTE — ED PROVIDER NOTES
[facility] ED  EMERGENCY DEPARTMENT ENCOUNTER      Pt Name: Johnathon Ba  MRN: [id number]  Birthdate 1975  Date of evaluation: 5/19/2024  Provider: Travis Mcgregor PA-C  7:39 PM    CHIEF COMPLAINT       Chief Complaint   Patient presents with    Laceration     Laceration     Loss of Consciousness     Laceration to left thumb while using table saw, patient passed out while walking into house to get spouse.  Patient is on plavix.  Patient is squeemish with blood.         HISTORY OF PRESENT ILLNESS    Johnathon Ba is a 48 y.o. male who presents to the emergency department with his wife after injury to his left thumb.  He was using a table saw dripping down some wood to make some plantars when he got his thumb caught in the sawblade.  Patient did have syncopal episode after looking at the injury but denies any other pain associated with his syncopal episode.  He is in need of a tetanus and is right-hand dominant he denies any needs for work restrictions.  He is not a smoker does not drink alcohol he is not diabetic.  Bleeding has been controlled with direct pressure and patient has very macerated flap type injury to the pulp of the distal left thumb and appears to have intact flexion at the DIP Joint as well as MCP.    The history is provided by the patient and the spouse. No  was used.       Nursing Notes were reviewed.    REVIEW OF SYSTEMS       Review of Systems    Except as noted above the remainder of the review of systems was reviewed and negative.       PAST MEDICAL HISTORY     Past Medical History:   Diagnosis Date    Angina effort     CAD (coronary artery disease)     Gout     Hypertension     no longer on medication    S/P cardiac cath 06/13/2023    [facility]/Dr. Allen/Right Radial         SURGICAL HISTORY       Past Surgical History:   Procedure Laterality Date    CARDIAC CATHETERIZATION Left 03/07/2024    L. radial / Dr. Allen / [facility]    CARDIAC

## 2024-07-03 ENCOUNTER — HOSPITAL ENCOUNTER (OUTPATIENT)
Age: 49
Discharge: HOME | End: 2024-07-03
Payer: COMMERCIAL

## 2024-07-03 LAB
ALBUMIN SERPL-MCNC: 4 G/DL (ref 3.5–5.2)
ALBUMIN/GLOB SERPL: 1.4 {RATIO} (ref 1–2.5)
ALP SERPL-CCNC: 105 U/L (ref 40–129)
ALT SERPL-CCNC: 25 U/L (ref 5–41)
ANION GAP SERPL CALCULATED.3IONS-SCNC: 11 MMOL/L (ref 9–17)
AST SERPL-CCNC: 17 U/L
BILIRUB SERPL-MCNC: 0.2 MG/DL (ref 0.3–1.2)
BUN SERPL-MCNC: 17 MG/DL (ref 6–20)
BUN/CREAT SERPL: 21 (ref 9–20)
CALCIUM SERPL-MCNC: 9.1 MG/DL (ref 8.6–10.4)
CHLORIDE SERPL-SCNC: 103 MMOL/L (ref 98–107)
CO2 SERPL-SCNC: 24 MMOL/L (ref 20–31)
CREAT SERPL-MCNC: 0.8 MG/DL (ref 0.7–1.2)
GFR, ESTIMATED: >90 ML/MIN/1.73M2
GLUCOSE SERPL-MCNC: 97 MG/DL (ref 70–99)
POTASSIUM SERPL-SCNC: 3.5 MMOL/L (ref 3.7–5.3)
PROT SERPL-MCNC: 6.9 G/DL (ref 6.4–8.3)
SODIUM SERPL-SCNC: 138 MMOL/L (ref 135–144)
URATE SERPL-MCNC: 6.1 MG/DL (ref 3.4–7)

## 2024-07-03 PROCEDURE — 84550 ASSAY OF BLOOD/URIC ACID: CPT

## 2024-07-03 PROCEDURE — 36415 COLL VENOUS BLD VENIPUNCTURE: CPT

## 2024-07-03 PROCEDURE — 80053 COMPREHEN METABOLIC PANEL: CPT

## 2024-10-01 ENCOUNTER — OFFICE VISIT (OUTPATIENT)
Dept: CARDIOLOGY | Age: 49
End: 2024-10-01
Payer: COMMERCIAL

## 2024-10-01 VITALS
BODY MASS INDEX: 34.85 KG/M2 | RESPIRATION RATE: 18 BRPM | HEIGHT: 73 IN | DIASTOLIC BLOOD PRESSURE: 75 MMHG | OXYGEN SATURATION: 98 % | SYSTOLIC BLOOD PRESSURE: 135 MMHG | WEIGHT: 263 LBS | HEART RATE: 68 BPM

## 2024-10-01 DIAGNOSIS — I25.708 CORONARY ARTERY DISEASE OF BYPASS GRAFT OF NATIVE HEART WITH STABLE ANGINA PECTORIS (HCC): ICD-10-CM

## 2024-10-01 DIAGNOSIS — E78.2 MIXED HYPERLIPIDEMIA: ICD-10-CM

## 2024-10-01 DIAGNOSIS — I10 ESSENTIAL HYPERTENSION: ICD-10-CM

## 2024-10-01 DIAGNOSIS — I25.5 ISCHEMIC CARDIOMYOPATHY: ICD-10-CM

## 2024-10-01 DIAGNOSIS — Z95.1 S/P CABG X 3: Primary | ICD-10-CM

## 2024-10-01 PROCEDURE — 93000 ELECTROCARDIOGRAM COMPLETE: CPT | Performed by: INTERNAL MEDICINE

## 2024-10-01 PROCEDURE — 3075F SYST BP GE 130 - 139MM HG: CPT | Performed by: INTERNAL MEDICINE

## 2024-10-01 PROCEDURE — 3078F DIAST BP <80 MM HG: CPT | Performed by: INTERNAL MEDICINE

## 2024-10-01 PROCEDURE — 99214 OFFICE O/P EST MOD 30 MIN: CPT | Performed by: INTERNAL MEDICINE

## 2024-10-01 RX ORDER — FUROSEMIDE 20 MG
TABLET ORAL
COMMUNITY
Start: 2024-09-20

## 2024-10-01 RX ORDER — DICYCLOMINE HCL 20 MG
20 TABLET ORAL
COMMUNITY
Start: 2024-09-20

## 2024-10-01 RX ORDER — SPIRONOLACTONE 25 MG/1
25 TABLET ORAL DAILY
Qty: 90 TABLET | Refills: 1 | Status: SHIPPED | OUTPATIENT
Start: 2024-10-01

## 2024-10-01 RX ORDER — CARVEDILOL 3.12 MG/1
3.12 TABLET ORAL 2 TIMES DAILY
Qty: 60 TABLET | Refills: 0 | Status: SHIPPED | OUTPATIENT
Start: 2024-10-01

## 2024-10-01 RX ORDER — PANTOPRAZOLE SODIUM 40 MG/1
40 TABLET, DELAYED RELEASE ORAL
COMMUNITY
Start: 2024-09-20 | End: 2024-12-19

## 2024-10-01 NOTE — PROGRESS NOTES
70% mid RCA stenosis, 90% distal RCA stenosis, 60% mid Cx stenosis, subtotal occlusion of a moderate sized D1 branch of the LAD and subtotal 99% stenosis in the left anterior descending coronary artery.   CABG done on 6/28/23 at [facility] with LIMA to LAD, SVG to D1 and SVG to PDA.  Cardiac cath on 3/7/2024: Severe three vessel coronary artery disease involving a 70% proximal and mid RCA stenosis, a 60% mid Cx stenosis, a 50% proximal LAD and mid 80% stenosis in the left anterior descending coronary artery. 3 of 3 bypass grafts patent. Normal left ventricular end diastolic pressure. Proceed with aggressive maximal medical management as clinically indicated. Discussed with Dr. Allen as well as Dr. Calzada regarding potential intervention and we were all in agreement with proceeding with guideline maximal medical management.  Antiplatelet Agent: Continue aspirin 81 mg daily and Plavix 75 mg daily.  Beta Blocker: START Carvedilol (Coreg) 3.125 mg twice daily. I also discussed the potential side effects of this medication including lightheadedness and dizziness and instructed them to stop the medication of this occurs and call our office if this occurs.  Patient is called taking metoprolol and Ranexa.  Continue Imdur 30 mg daily.  He is complaining of symmetrical rash in both arms and torso, I do believe this is mainly caused by low-dose amlodipine.  I told him we will discontinue amlodipine and starting carvedilol as above.  Cholesterol Reduction Therapy: Continue Atorvastatin (Lipitor) 20 mg daily. I discussed the potential benefits of statin therapy as well as the potential risks including myalgia as well as the rare but potentially serious complication of liver or kidney damage. Although rare, I told them that this could be serious and therefore told them to stop the medication immediately and call if they developed any severe muscle aches or pains and they agreed to do so. and ezetimide (Zetia) 10 mg

## 2024-11-22 ENCOUNTER — TELEPHONE (OUTPATIENT)
Dept: GASTROENTEROLOGY | Age: 49
End: 2024-11-22

## 2024-11-22 NOTE — TELEPHONE ENCOUNTER
TBS/ colonoscopy [place]  GI visit for CHF & Pavix  Attempt 1- left voicemail  Attempt 2- sent letter

## 2025-02-24 RX ORDER — ISOSORBIDE MONONITRATE 30 MG/1
30 TABLET, EXTENDED RELEASE ORAL DAILY
Qty: 90 TABLET | Refills: 3 | Status: SHIPPED | OUTPATIENT
Start: 2025-02-24

## 2025-03-14 ENCOUNTER — HOSPITAL ENCOUNTER (OUTPATIENT)
Age: 50
Discharge: HOME | End: 2025-03-14
Payer: COMMERCIAL

## 2025-03-14 LAB
ALBUMIN SERPL-MCNC: 4.5 G/DL (ref 3.5–5.2)
ALBUMIN/GLOB SERPL: 1.9 {RATIO} (ref 1–2.5)
ALP SERPL-CCNC: 93 U/L (ref 40–129)
ALT SERPL-CCNC: 22 U/L (ref 10–50)
ANION GAP SERPL CALCULATED.3IONS-SCNC: 10 MMOL/L (ref 9–16)
AST SERPL-CCNC: 21 U/L (ref 10–50)
BASOPHILS # BLD: 0.06 K/UL (ref 0–0.2)
BASOPHILS NFR BLD: 1 % (ref 0–2)
BILIRUB SERPL-MCNC: 0.4 MG/DL (ref 0–1.2)
BUN SERPL-MCNC: 16 MG/DL (ref 6–20)
BUN/CREAT SERPL: 18 (ref 9–20)
CALCIUM SERPL-MCNC: 9.3 MG/DL (ref 8.6–10.4)
CHLORIDE SERPL-SCNC: 104 MMOL/L (ref 98–107)
CHOLEST SERPL-MCNC: 248 MG/DL (ref 0–199)
CHOLESTEROL/HDL RATIO: 6.7
CO2 SERPL-SCNC: 26 MMOL/L (ref 20–31)
CREAT SERPL-MCNC: 0.9 MG/DL (ref 0.7–1.2)
EOSINOPHIL # BLD: 0.14 K/UL (ref 0–0.44)
EOSINOPHILS RELATIVE PERCENT: 3 % (ref 1–4)
ERYTHROCYTE [DISTWIDTH] IN BLOOD BY AUTOMATED COUNT: 12.3 % (ref 11.8–14.4)
EST. AVERAGE GLUCOSE BLD GHB EST-MCNC: 94 MG/DL
FOLATE SERPL-MCNC: 4.2 NG/ML (ref 4.8–24.2)
GFR, ESTIMATED: >90 ML/MIN/1.73M2
GLUCOSE SERPL-MCNC: 123 MG/DL (ref 74–99)
HBA1C MFR BLD: 4.9 % (ref 4–6)
HCT VFR BLD AUTO: 40.7 % (ref 40.7–50.3)
HDLC SERPL-MCNC: 37 MG/DL
HGB BLD-MCNC: 13.8 G/DL (ref 13–17)
IMM GRANULOCYTES # BLD AUTO: <0.03 K/UL (ref 0–0.3)
IMM GRANULOCYTES NFR BLD: 0 %
LDLC SERPL CALC-MCNC: 163 MG/DL (ref 0–100)
LYMPHOCYTES NFR BLD: 1.08 K/UL (ref 1.1–3.7)
LYMPHOCYTES RELATIVE PERCENT: 24 % (ref 24–43)
MAGNESIUM SERPL-MCNC: 1.8 MG/DL (ref 1.6–2.6)
MCH RBC QN AUTO: 31.7 PG (ref 25.2–33.5)
MCHC RBC AUTO-ENTMCNC: 33.9 G/DL (ref 28.4–34.8)
MCV RBC AUTO: 93.6 FL (ref 82.6–102.9)
MONOCYTES NFR BLD: 0.48 K/UL (ref 0.1–1.2)
MONOCYTES NFR BLD: 11 % (ref 3–12)
NEUTROPHILS NFR BLD: 61 % (ref 36–65)
NEUTS SEG NFR BLD: 2.81 K/UL (ref 1.5–8.1)
NRBC BLD-RTO: 0 PER 100 WBC
PLATELET # BLD AUTO: 213 K/UL (ref 138–453)
PMV BLD AUTO: 10.1 FL (ref 8.1–13.5)
POTASSIUM SERPL-SCNC: 3.9 MMOL/L (ref 3.7–5.3)
PROT SERPL-MCNC: 6.9 G/DL (ref 6.6–8.7)
PSA SERPL-MCNC: 0.54 NG/ML (ref 0–4)
RBC # BLD AUTO: 4.35 M/UL (ref 4.21–5.77)
RHEUMATOID FACT SER NEPH-ACNC: <10 IU/ML (ref 0–13)
SODIUM SERPL-SCNC: 140 MMOL/L (ref 136–145)
T3FREE SERPL-MCNC: 3.32 PG/ML (ref 2–4.4)
T4 FREE SERPL-MCNC: 1.1 NG/DL (ref 0.92–1.68)
TRIGL SERPL-MCNC: 238 MG/DL
TSH SERPL DL<=0.05 MIU/L-ACNC: 2.73 UIU/ML (ref 0.27–4.2)
URATE SERPL-MCNC: 8.5 MG/DL (ref 3.4–7)
VIT B12 SERPL-MCNC: 357 PG/ML (ref 232–1245)
VLDLC SERPL CALC-MCNC: 48 MG/DL (ref 1–30)
WBC OTHER # BLD: 4.6 K/UL (ref 3.5–11.3)

## 2025-03-14 PROCEDURE — G0103 PSA SCREENING: HCPCS

## 2025-03-14 PROCEDURE — 80053 COMPREHEN METABOLIC PANEL: CPT

## 2025-03-14 PROCEDURE — 86431 RHEUMATOID FACTOR QUANT: CPT

## 2025-03-14 PROCEDURE — 84443 ASSAY THYROID STIM HORMONE: CPT

## 2025-03-14 PROCEDURE — 83036 HEMOGLOBIN GLYCOSYLATED A1C: CPT

## 2025-03-14 PROCEDURE — 86225 DNA ANTIBODY NATIVE: CPT

## 2025-03-14 PROCEDURE — 84550 ASSAY OF BLOOD/URIC ACID: CPT

## 2025-03-14 PROCEDURE — 86038 ANTINUCLEAR ANTIBODIES: CPT

## 2025-03-14 PROCEDURE — 84481 FREE ASSAY (FT-3): CPT

## 2025-03-14 PROCEDURE — 36415 COLL VENOUS BLD VENIPUNCTURE: CPT

## 2025-03-14 PROCEDURE — 84439 ASSAY OF FREE THYROXINE: CPT

## 2025-03-14 PROCEDURE — 83735 ASSAY OF MAGNESIUM: CPT

## 2025-03-14 PROCEDURE — 85025 COMPLETE CBC W/AUTO DIFF WBC: CPT

## 2025-03-14 PROCEDURE — 82607 VITAMIN B-12: CPT

## 2025-03-14 PROCEDURE — 82746 ASSAY OF FOLIC ACID SERUM: CPT

## 2025-03-14 PROCEDURE — 80061 LIPID PANEL: CPT

## 2025-03-17 LAB
ANA SER QL IA: NEGATIVE
DSDNA IGG SER QL IA: 0.9 IU/ML
NUCLEAR IGG SER IA-RTO: 0.1 U/ML

## 2025-04-08 DIAGNOSIS — M1A.0790 CHRONIC GOUT OF FOOT, UNSPECIFIED CAUSE, UNSPECIFIED LATERALITY: ICD-10-CM

## 2025-04-08 DIAGNOSIS — G47.33 OSA (OBSTRUCTIVE SLEEP APNEA): ICD-10-CM

## 2025-04-08 DIAGNOSIS — I10 ESSENTIAL HYPERTENSION: ICD-10-CM

## 2025-04-08 DIAGNOSIS — R42 LIGHTHEADED: ICD-10-CM

## 2025-04-08 DIAGNOSIS — R94.31 ABNORMAL EKG: ICD-10-CM

## 2025-04-08 DIAGNOSIS — I25.10 ASHD (ARTERIOSCLEROTIC HEART DISEASE): ICD-10-CM

## 2025-04-08 DIAGNOSIS — I50.42 CHRONIC COMBINED SYSTOLIC AND DIASTOLIC CONGESTIVE HEART FAILURE (HCC): ICD-10-CM

## 2025-04-08 DIAGNOSIS — R06.02 SOB (SHORTNESS OF BREATH): ICD-10-CM

## 2025-04-08 DIAGNOSIS — E78.2 MIXED HYPERLIPIDEMIA: ICD-10-CM

## 2025-04-08 DIAGNOSIS — Z95.1 S/P CABG X 3: ICD-10-CM

## 2025-04-08 DIAGNOSIS — I25.5 ISCHEMIC CARDIOMYOPATHY: ICD-10-CM

## 2025-04-08 DIAGNOSIS — I25.708 CORONARY ARTERY DISEASE OF BYPASS GRAFT OF NATIVE HEART WITH STABLE ANGINA PECTORIS: ICD-10-CM

## 2025-04-08 DIAGNOSIS — R53.83 TIREDNESS: ICD-10-CM

## 2025-04-08 DIAGNOSIS — G44.229 CHRONIC TENSION-TYPE HEADACHE, NOT INTRACTABLE: ICD-10-CM

## 2025-04-08 DIAGNOSIS — E66.812 CLASS 2 OBESITY WITH BODY MASS INDEX (BMI) OF 35.0 TO 35.9 IN ADULT, UNSPECIFIED OBESITY TYPE, UNSPECIFIED WHETHER SERIOUS COMORBIDITY PRESENT: ICD-10-CM

## 2025-04-08 DIAGNOSIS — I20.89 CHRONIC STABLE ANGINA: ICD-10-CM

## 2025-04-08 RX ORDER — ATORVASTATIN CALCIUM 40 MG/1
40 TABLET, FILM COATED ORAL
Qty: 90 TABLET | Refills: 3 | Status: SHIPPED | OUTPATIENT
Start: 2025-04-08

## 2025-04-08 RX ORDER — EZETIMIBE 10 MG/1
10 TABLET ORAL DAILY
Qty: 90 TABLET | Refills: 3 | Status: SHIPPED | OUTPATIENT
Start: 2025-04-08

## 2025-04-08 RX ORDER — CARVEDILOL 3.12 MG/1
3.12 TABLET ORAL 2 TIMES DAILY
Qty: 180 TABLET | Refills: 3 | Status: SHIPPED | OUTPATIENT
Start: 2025-04-08

## 2025-04-08 RX ORDER — SPIRONOLACTONE 25 MG/1
25 TABLET ORAL DAILY
Qty: 90 TABLET | Refills: 3 | Status: SHIPPED | OUTPATIENT
Start: 2025-04-08

## 2025-04-08 RX ORDER — AMLODIPINE BESYLATE 2.5 MG/1
2.5 TABLET ORAL DAILY
Qty: 90 TABLET | Refills: 3 | Status: SHIPPED | OUTPATIENT
Start: 2025-04-08

## 2025-04-08 RX ORDER — ATORVASTATIN CALCIUM 80 MG/1
80 TABLET, FILM COATED ORAL
Qty: 90 TABLET | Refills: 3 | Status: SHIPPED | OUTPATIENT
Start: 2025-04-08 | End: 2025-04-08 | Stop reason: SDUPTHER

## 2025-04-08 RX ORDER — CLOPIDOGREL BISULFATE 75 MG/1
75 TABLET ORAL DAILY
Qty: 90 TABLET | Refills: 3 | Status: SHIPPED | OUTPATIENT
Start: 2025-04-08

## 2025-04-08 NOTE — TELEPHONE ENCOUNTER
Spoke with patient and he is taking 20 mg, pts last LDL is 163 mg on 3/14/25. Per Dr. Wood he is to take Atorvastatin 40 mg once daily and Zetia 10 mg once daily. Please sign pended medication

## 2025-07-08 ENCOUNTER — OFFICE VISIT (OUTPATIENT)
Dept: CARDIOLOGY | Age: 50
End: 2025-07-08
Payer: COMMERCIAL

## 2025-07-08 VITALS
HEART RATE: 62 BPM | DIASTOLIC BLOOD PRESSURE: 71 MMHG | RESPIRATION RATE: 18 BRPM | OXYGEN SATURATION: 97 % | BODY MASS INDEX: 31.17 KG/M2 | HEIGHT: 73 IN | WEIGHT: 235.2 LBS | SYSTOLIC BLOOD PRESSURE: 122 MMHG

## 2025-07-08 DIAGNOSIS — Z95.1 S/P CABG X 3: ICD-10-CM

## 2025-07-08 DIAGNOSIS — I25.708 CORONARY ARTERY DISEASE OF BYPASS GRAFT OF NATIVE HEART WITH STABLE ANGINA PECTORIS: Primary | ICD-10-CM

## 2025-07-08 DIAGNOSIS — I10 ESSENTIAL HYPERTENSION: ICD-10-CM

## 2025-07-08 DIAGNOSIS — E78.2 MIXED HYPERLIPIDEMIA: ICD-10-CM

## 2025-07-08 DIAGNOSIS — G47.33 OSA (OBSTRUCTIVE SLEEP APNEA): ICD-10-CM

## 2025-07-08 DIAGNOSIS — I50.42 CHRONIC COMBINED SYSTOLIC AND DIASTOLIC CONGESTIVE HEART FAILURE (HCC): ICD-10-CM

## 2025-07-08 DIAGNOSIS — G44.229 CHRONIC TENSION-TYPE HEADACHE, NOT INTRACTABLE: ICD-10-CM

## 2025-07-08 DIAGNOSIS — I25.5 ISCHEMIC CARDIOMYOPATHY: ICD-10-CM

## 2025-07-08 DIAGNOSIS — E66.812 CLASS 2 OBESITY WITH BODY MASS INDEX (BMI) OF 35.0 TO 35.9 IN ADULT, UNSPECIFIED OBESITY TYPE, UNSPECIFIED WHETHER SERIOUS COMORBIDITY PRESENT: ICD-10-CM

## 2025-07-08 DIAGNOSIS — I20.89 CHRONIC STABLE ANGINA: ICD-10-CM

## 2025-07-08 PROCEDURE — 3074F SYST BP LT 130 MM HG: CPT | Performed by: NURSE PRACTITIONER

## 2025-07-08 PROCEDURE — 3078F DIAST BP <80 MM HG: CPT | Performed by: NURSE PRACTITIONER

## 2025-07-08 PROCEDURE — G8417 CALC BMI ABV UP PARAM F/U: HCPCS | Performed by: NURSE PRACTITIONER

## 2025-07-08 PROCEDURE — 3017F COLORECTAL CA SCREEN DOC REV: CPT | Performed by: NURSE PRACTITIONER

## 2025-07-08 PROCEDURE — 1036F TOBACCO NON-USER: CPT | Performed by: NURSE PRACTITIONER

## 2025-07-08 PROCEDURE — 99214 OFFICE O/P EST MOD 30 MIN: CPT | Performed by: NURSE PRACTITIONER

## 2025-07-08 PROCEDURE — G8427 DOCREV CUR MEDS BY ELIG CLIN: HCPCS | Performed by: NURSE PRACTITIONER

## 2025-07-08 PROCEDURE — 93000 ELECTROCARDIOGRAM COMPLETE: CPT | Performed by: NURSE PRACTITIONER

## 2025-07-08 RX ORDER — NITROGLYCERIN 0.4 MG/1
TABLET SUBLINGUAL
Qty: 25 TABLET | Refills: 3 | Status: SHIPPED | OUTPATIENT
Start: 2025-07-08

## 2025-07-08 RX ORDER — AMLODIPINE BESYLATE 5 MG/1
5 TABLET ORAL DAILY
Qty: 90 TABLET | Refills: 3 | Status: SHIPPED | OUTPATIENT
Start: 2025-07-08

## 2025-07-08 RX ORDER — ISOSORBIDE MONONITRATE 30 MG/1
30 TABLET, EXTENDED RELEASE ORAL 2 TIMES DAILY
Qty: 90 TABLET | Refills: 3 | Status: SHIPPED | OUTPATIENT
Start: 2025-07-08

## 2025-07-08 RX ORDER — RANOLAZINE 500 MG/1
500 TABLET, EXTENDED RELEASE ORAL 2 TIMES DAILY
Qty: 60 TABLET | Refills: 3 | Status: SHIPPED | OUTPATIENT
Start: 2025-07-08

## 2025-07-08 NOTE — PROGRESS NOTES
Patient: Johnathon Ba  : 1975  Today's Date: 2025    HPI: I had the pleasure of seeing Mr. Ba today who is a 49 y.o. male with a history of intermittent chest discomfort and shortness of breat, COPD, congestive heart failure, hypertension.     He reported history of coronary artery disease diagnosed by cardiac catheterization around  at [facility].  He was told that he has some blockages and was treated with medication.  He stopped taking his cardiac medications about a year after his cardiac cath.  He did not follow-up with cardiology since. He saw Dr Wood in  and never follow up. He does not see a PCP regularly and does not take any of his prescribed medications.    He has been smoking for over 20 years and one to 1 1/2 packs daily. His father had heart disease starting at 60 and  at 64 years old. He has a history of hypertension denies having diabetes or high cholesterol. He had heart cath done 10-12 years ago at [facility].     EKG done in office 10/26/2020 shows nonspecific T wave abnormalities. 74 bpm.    Stress test 10/30/2020: Overall, these results are most consistent with low risk scan.    Echo done 10/30/2020: Global left ventricular systolic function appears mildly reduced with an estimated ejection fraction of 45%. Mildly increased left ventricular wall thickness with a normal left ventricular cavity size.  No definite specific wall motion abnormalities were identified. No significant valvular abnormalities. Evidence of mild diastolic dysfunction is seen.    Echo done on 2023- Global left ventricular systolic function appears mildly reduced with an estimated ejection fraction of 45%. The left ventricular cavity size is within normal limits and the left ventricular wall thickness is moderately increased. Thinning and akinetic apical segment. The left atrium is moderately dilated (34-39) with a left atrial volume index of 38 ml/m2. No

## 2025-07-11 ENCOUNTER — RESULTS FOLLOW-UP (OUTPATIENT)
Dept: CARDIOLOGY | Age: 50
End: 2025-07-11

## 2025-07-11 ENCOUNTER — HOSPITAL ENCOUNTER (OUTPATIENT)
Age: 50
Discharge: HOME | End: 2025-07-13
Payer: COMMERCIAL

## 2025-07-11 VITALS
SYSTOLIC BLOOD PRESSURE: 122 MMHG | HEIGHT: 73 IN | DIASTOLIC BLOOD PRESSURE: 71 MMHG | WEIGHT: 235 LBS | BODY MASS INDEX: 31.14 KG/M2

## 2025-07-11 DIAGNOSIS — G47.33 OSA (OBSTRUCTIVE SLEEP APNEA): ICD-10-CM

## 2025-07-11 DIAGNOSIS — E66.812 CLASS 2 OBESITY WITH BODY MASS INDEX (BMI) OF 35.0 TO 35.9 IN ADULT, UNSPECIFIED OBESITY TYPE, UNSPECIFIED WHETHER SERIOUS COMORBIDITY PRESENT: ICD-10-CM

## 2025-07-11 DIAGNOSIS — G44.229 CHRONIC TENSION-TYPE HEADACHE, NOT INTRACTABLE: ICD-10-CM

## 2025-07-11 DIAGNOSIS — E78.2 MIXED HYPERLIPIDEMIA: ICD-10-CM

## 2025-07-11 DIAGNOSIS — Z95.1 S/P CABG X 3: ICD-10-CM

## 2025-07-11 DIAGNOSIS — I25.708 CORONARY ARTERY DISEASE OF BYPASS GRAFT OF NATIVE HEART WITH STABLE ANGINA PECTORIS: ICD-10-CM

## 2025-07-11 DIAGNOSIS — I10 ESSENTIAL HYPERTENSION: ICD-10-CM

## 2025-07-11 DIAGNOSIS — I20.89 CHRONIC STABLE ANGINA: ICD-10-CM

## 2025-07-11 DIAGNOSIS — I50.42 CHRONIC COMBINED SYSTOLIC AND DIASTOLIC CONGESTIVE HEART FAILURE (HCC): ICD-10-CM

## 2025-07-11 DIAGNOSIS — I25.5 ISCHEMIC CARDIOMYOPATHY: ICD-10-CM

## 2025-07-11 LAB
ECHO AO ROOT DIAM: 3.7 CM
ECHO AO ROOT INDEX: 1.61 CM/M2
ECHO AV CUSP MM: 1.8 CM
ECHO AV MEAN GRADIENT: 5 MMHG
ECHO AV MEAN VELOCITY: 1 M/S
ECHO AV PEAK GRADIENT: 8 MMHG
ECHO AV PEAK VELOCITY: 1.4 M/S
ECHO AV VELOCITY RATIO: 0.79
ECHO AV VTI: 36.8 CM
ECHO BSA: 2.34 M2
ECHO EST RA PRESSURE: 3 MMHG
ECHO LA AREA 2C: 20.7 CM2
ECHO LA AREA 4C: 20.6 CM2
ECHO LA MAJOR AXIS: 5.8 CM
ECHO LA MINOR AXIS: 5.9 CM
ECHO LA VOL BP: 59 ML (ref 18–58)
ECHO LA VOL MOD A2C: 59 ML (ref 18–58)
ECHO LA VOL MOD A4C: 60 ML (ref 18–58)
ECHO LA VOL/BSA BIPLANE: 26 ML/M2 (ref 16–34)
ECHO LA VOLUME INDEX MOD A2C: 26 ML/M2 (ref 16–34)
ECHO LA VOLUME INDEX MOD A4C: 26 ML/M2 (ref 16–34)
ECHO LV E' LATERAL VELOCITY: 10.9 CM/S
ECHO LV EDV A2C: 110 ML
ECHO LV EDV A4C: 124 ML
ECHO LV EDV INDEX A4C: 54 ML/M2
ECHO LV EDV NDEX A2C: 48 ML/M2
ECHO LV EF PHYSICIAN: 60 %
ECHO LV EJECTION FRACTION A2C: 55 %
ECHO LV EJECTION FRACTION A4C: 60 %
ECHO LV EJECTION FRACTION BIPLANE: 58 % (ref 55–100)
ECHO LV ESV A2C: 49 ML
ECHO LV ESV A4C: 49 ML
ECHO LV ESV INDEX A2C: 21 ML/M2
ECHO LV ESV INDEX A4C: 21 ML/M2
ECHO LV FRACTIONAL SHORTENING: 35 % (ref 28–44)
ECHO LV INTERNAL DIMENSION DIASTOLE INDEX: 2.26 CM/M2
ECHO LV INTERNAL DIMENSION DIASTOLIC: 5.2 CM (ref 4.2–5.9)
ECHO LV INTERNAL DIMENSION SYSTOLIC INDEX: 1.48 CM/M2
ECHO LV INTERNAL DIMENSION SYSTOLIC: 3.4 CM
ECHO LV IVSD: 1 CM (ref 0.6–1)
ECHO LV MASS 2D: 194.2 G (ref 88–224)
ECHO LV MASS INDEX 2D: 84.4 G/M2 (ref 49–115)
ECHO LV POSTERIOR WALL DIASTOLIC: 1 CM (ref 0.6–1)
ECHO LV RELATIVE WALL THICKNESS RATIO: 0.38
ECHO LVOT AV VTI INDEX: 0.75
ECHO LVOT MEAN GRADIENT: 3 MMHG
ECHO LVOT PEAK GRADIENT: 5 MMHG
ECHO LVOT PEAK VELOCITY: 1.1 M/S
ECHO LVOT VTI: 27.6 CM
ECHO MV A VELOCITY: 0.49 M/S
ECHO MV E DECELERATION TIME (DT): 236 MS
ECHO MV E VELOCITY: 0.87 M/S
ECHO MV E/A RATIO: 1.78
ECHO MV E/E' LATERAL: 7.98
ECHO PV MAX VELOCITY: 1 M/S
ECHO PV PEAK GRADIENT: 4 MMHG
ECHO RIGHT VENTRICULAR SYSTOLIC PRESSURE (RVSP): 25 MMHG
ECHO RV TAPSE: 1.6 CM (ref 1.7–?)
ECHO TV REGURGITANT MAX VELOCITY: 2.36 M/S
ECHO TV REGURGITANT PEAK GRADIENT: 22 MMHG

## 2025-07-11 PROCEDURE — 93306 TTE W/DOPPLER COMPLETE: CPT

## 2025-07-11 PROCEDURE — 93306 TTE W/DOPPLER COMPLETE: CPT | Performed by: INTERNAL MEDICINE

## 2025-07-11 NOTE — RESULT ENCOUNTER NOTE
Please let patient know echo was ok. Please continue current treatment and follow up. Please call with questions/concerns. Thank you

## 2025-07-11 NOTE — TELEPHONE ENCOUNTER
----- Message from SHELLEY Austin CNP sent at 7/11/2025  1:50 PM EDT -----  Please let patient know echo was ok. Please continue current treatment and follow up. Please call with questions/concerns. Thank you

## 2025-08-11 ENCOUNTER — OFFICE VISIT (OUTPATIENT)
Dept: CARDIOLOGY | Age: 50
End: 2025-08-11
Payer: COMMERCIAL

## 2025-08-11 VITALS
HEART RATE: 65 BPM | DIASTOLIC BLOOD PRESSURE: 67 MMHG | BODY MASS INDEX: 32.15 KG/M2 | RESPIRATION RATE: 18 BRPM | SYSTOLIC BLOOD PRESSURE: 117 MMHG | WEIGHT: 242.6 LBS | HEIGHT: 73 IN | OXYGEN SATURATION: 98 %

## 2025-08-11 DIAGNOSIS — I25.10 ASHD (ARTERIOSCLEROTIC HEART DISEASE): Primary | ICD-10-CM

## 2025-08-11 DIAGNOSIS — I10 PRIMARY HYPERTENSION: ICD-10-CM

## 2025-08-11 DIAGNOSIS — E66.811 CLASS 1 OBESITY WITH BODY MASS INDEX (BMI) OF 32.0 TO 32.9 IN ADULT, UNSPECIFIED OBESITY TYPE, UNSPECIFIED WHETHER SERIOUS COMORBIDITY PRESENT: ICD-10-CM

## 2025-08-11 DIAGNOSIS — E78.2 MIXED HYPERLIPIDEMIA: ICD-10-CM

## 2025-08-11 DIAGNOSIS — I25.5 ISCHEMIC CARDIOMYOPATHY: ICD-10-CM

## 2025-08-11 DIAGNOSIS — Z95.1 S/P CABG X 3: ICD-10-CM

## 2025-08-11 PROCEDURE — 3074F SYST BP LT 130 MM HG: CPT | Performed by: INTERNAL MEDICINE

## 2025-08-11 PROCEDURE — G8427 DOCREV CUR MEDS BY ELIG CLIN: HCPCS | Performed by: INTERNAL MEDICINE

## 2025-08-11 PROCEDURE — 3017F COLORECTAL CA SCREEN DOC REV: CPT | Performed by: INTERNAL MEDICINE

## 2025-08-11 PROCEDURE — 1036F TOBACCO NON-USER: CPT | Performed by: INTERNAL MEDICINE

## 2025-08-11 PROCEDURE — 99214 OFFICE O/P EST MOD 30 MIN: CPT | Performed by: INTERNAL MEDICINE

## 2025-08-11 PROCEDURE — G8417 CALC BMI ABV UP PARAM F/U: HCPCS | Performed by: INTERNAL MEDICINE

## 2025-08-11 PROCEDURE — 3078F DIAST BP <80 MM HG: CPT | Performed by: INTERNAL MEDICINE

## 2025-08-11 RX ORDER — AMLODIPINE BESYLATE 2.5 MG/1
2.5 TABLET ORAL DAILY
COMMUNITY

## 2025-08-11 RX ORDER — ATORVASTATIN CALCIUM 80 MG/1
80 TABLET, FILM COATED ORAL DAILY
COMMUNITY

## 2025-08-11 RX ORDER — FOLIC ACID 1 MG/1
1 TABLET ORAL DAILY
COMMUNITY

## (undated) DEVICE — GLOVE ORANGE PI 8   MSG9080

## (undated) DEVICE — CUFF TOURNIQUET 34 SNG BLADDER DUAL PORT

## (undated) DEVICE — [TOMCAT CUTTER, ARTHROSCOPIC SHAVER BLADE,  DO NOT RESTERILIZE,  DO NOT USE IF PACKAGE IS DAMAGED,  KEEP DRY,  KEEP AWAY FROM SUNLIGHT]: Brand: FORMULA

## (undated) DEVICE — SUTURE VCRL + SZ 3-0 L27IN ABSRB UD L26MM SH 1/2 CIR VCP416H

## (undated) DEVICE — DRAPE, RADIAL, STERILE: Brand: MEDLINE

## (undated) DEVICE — BANDAGE COMPR W4INXL5YD WHT BGE POLY COT M E WRP WV HK AND

## (undated) DEVICE — TUBING PMP L16FT MAIN DISP FOR AR-6400 AR-6475

## (undated) DEVICE — SOLUTION IV IRRIG POUR BRL 0.9% SODIUM CHL 2F7124

## (undated) DEVICE — Device

## (undated) DEVICE — PACK,ARTHROSCOPY I,SIRUS: Brand: MEDLINE

## (undated) DEVICE — DRIP REDUCTION MANIFOLD

## (undated) DEVICE — GAUZE,SPONGE,FLUFF,6"X6.75",STRL,5/TRAY: Brand: MEDLINE

## (undated) DEVICE — SUTURE MCRYL + SZ 4-0 L27IN ABSRB UD L19MM PS-2 3/8 CIR MCP426H

## (undated) DEVICE — ELECTRODE ES AD CRD L15FT DISP FOR PT BELOW 30LB REM

## (undated) DEVICE — CATHETER ANGIO 6FR L100CM DIA0.056IN FL4 CRV VASC ACCS EXPO

## (undated) DEVICE — GLOVE SURG SZ 75 L12IN FNGR THK87MIL WHT LTX FREE

## (undated) DEVICE — SOLUTION SURG PREP ANTIMICROBIAL 4 OZ SKIN WND EXIDINE

## (undated) DEVICE — SOLUTION IV IRRIG 0.9% NACL 3000ML BAG 2B7477

## (undated) DEVICE — NEEDLE SPNL L3.5IN PNK HUB S STL REG WALL FIT STYL W/ QNCKE

## (undated) DEVICE — GLOVE SURG SZ 75 L12IN FNGR THK87MIL DK GRN LTX FREE ISOLEX

## (undated) DEVICE — PAD,ABDOMINAL,8"X10",ST,LF: Brand: MEDLINE

## (undated) DEVICE — SUTURE ETHLN SZ 4-0 L18IN NONABSORBABLE BLK L19MM PS-2 3/8 1667H

## (undated) DEVICE — GLIDESHEATH NITINOL HYDROPHILIC COATED INTRODUCER SHEATH: Brand: GLIDESHEATH

## (undated) DEVICE — BENTSON WIRE GUIDE 20CM DISTAL FLEXIBILITY WITH SOFTENED TIP: Brand: BENTSON

## (undated) DEVICE — CATHETER ANGIO 6FR L100CM DIA0.056IN FR4 CRV VASC ACCS EXPO

## (undated) DEVICE — SPONGE GZ W4XL4IN CELOS POSTOP AVANT

## (undated) DEVICE — BLADE SAW RESECTOR CUT 4MM

## (undated) DEVICE — Z DISCONTINUED BY MEDLINE USE 2711682 TRAY SKIN PREP DRY W/ PREM GLV